# Patient Record
Sex: FEMALE | Race: BLACK OR AFRICAN AMERICAN | NOT HISPANIC OR LATINO | Employment: OTHER | RURAL
[De-identification: names, ages, dates, MRNs, and addresses within clinical notes are randomized per-mention and may not be internally consistent; named-entity substitution may affect disease eponyms.]

---

## 2018-10-18 ENCOUNTER — HISTORICAL (OUTPATIENT)
Dept: ADMINISTRATIVE | Facility: HOSPITAL | Age: 63
End: 2018-10-18

## 2018-10-18 LAB — CRC RECOMMENDATION EXT: NORMAL

## 2018-10-19 LAB
LAB AP CLINICAL INFORMATION: NORMAL
LAB AP DIAGNOSIS - HISTORICAL: NORMAL
LAB AP GROSS PATHOLOGY - HISTORICAL: NORMAL
LAB AP SPECIMEN SUBMITTED - HISTORICAL: NORMAL

## 2018-11-07 ENCOUNTER — HISTORICAL (OUTPATIENT)
Dept: ADMINISTRATIVE | Facility: HOSPITAL | Age: 63
End: 2018-11-07

## 2018-11-08 LAB
LAB AP CLINICAL INFORMATION: NORMAL
LAB AP COMMENTS: NORMAL
LAB AP DIAGNOSIS - HISTORICAL: NORMAL
LAB AP GROSS PATHOLOGY - HISTORICAL: NORMAL
LAB AP SPECIMEN SUBMITTED - HISTORICAL: NORMAL

## 2021-05-05 DIAGNOSIS — M17.12 DEGENERATIVE ARTHRITIS OF LEFT KNEE: Primary | ICD-10-CM

## 2021-05-19 ENCOUNTER — IMMUNIZATION (OUTPATIENT)
Dept: FAMILY MEDICINE | Facility: CLINIC | Age: 66
End: 2021-05-19
Payer: MEDICARE

## 2021-05-19 DIAGNOSIS — Z23 NEED FOR VACCINATION: Primary | ICD-10-CM

## 2021-05-19 PROCEDURE — 0012A COVID-19, MRNA, LNP-S, PF, 100 MCG/0.5 ML DOSE VACCINE: CPT | Mod: ,,, | Performed by: FAMILY MEDICINE

## 2021-05-19 PROCEDURE — 91301 COVID-19, MRNA, LNP-S, PF, 100 MCG/0.5 ML DOSE VACCINE: ICD-10-PCS | Mod: ,,, | Performed by: FAMILY MEDICINE

## 2021-05-19 PROCEDURE — 91301 COVID-19, MRNA, LNP-S, PF, 100 MCG/0.5 ML DOSE VACCINE: CPT | Mod: ,,, | Performed by: FAMILY MEDICINE

## 2021-05-19 PROCEDURE — 0012A COVID-19, MRNA, LNP-S, PF, 100 MCG/0.5 ML DOSE VACCINE: ICD-10-PCS | Mod: ,,, | Performed by: FAMILY MEDICINE

## 2021-05-28 RX ORDER — SERTRALINE HYDROCHLORIDE 50 MG/1
50 TABLET, FILM COATED ORAL DAILY
COMMUNITY
Start: 2021-04-20 | End: 2021-06-03 | Stop reason: SDUPTHER

## 2021-05-28 RX ORDER — ATENOLOL AND CHLORTHALIDONE TABLET 50; 25 MG/1; MG/1
1 TABLET ORAL DAILY
COMMUNITY
Start: 2021-02-02 | End: 2021-05-28 | Stop reason: SDUPTHER

## 2021-06-01 RX ORDER — ATENOLOL AND CHLORTHALIDONE TABLET 50; 25 MG/1; MG/1
1 TABLET ORAL DAILY
Qty: 90 TABLET | Refills: 1 | Status: SHIPPED | OUTPATIENT
Start: 2021-06-01 | End: 2021-06-03 | Stop reason: SDUPTHER

## 2021-06-03 ENCOUNTER — OFFICE VISIT (OUTPATIENT)
Dept: FAMILY MEDICINE | Facility: CLINIC | Age: 66
End: 2021-06-03
Payer: COMMERCIAL

## 2021-06-03 VITALS
RESPIRATION RATE: 20 BRPM | HEIGHT: 66 IN | OXYGEN SATURATION: 99 % | DIASTOLIC BLOOD PRESSURE: 86 MMHG | SYSTOLIC BLOOD PRESSURE: 122 MMHG | BODY MASS INDEX: 46.25 KG/M2 | TEMPERATURE: 96 F | WEIGHT: 287.81 LBS | HEART RATE: 70 BPM

## 2021-06-03 DIAGNOSIS — E03.9 HYPOTHYROIDISM, UNSPECIFIED TYPE: ICD-10-CM

## 2021-06-03 DIAGNOSIS — M25.569 KNEE PAIN, UNSPECIFIED CHRONICITY, UNSPECIFIED LATERALITY: ICD-10-CM

## 2021-06-03 DIAGNOSIS — E66.9 OBESITY, UNSPECIFIED CLASSIFICATION, UNSPECIFIED OBESITY TYPE, UNSPECIFIED WHETHER SERIOUS COMORBIDITY PRESENT: ICD-10-CM

## 2021-06-03 DIAGNOSIS — M54.9 BACK PAIN, UNSPECIFIED BACK LOCATION, UNSPECIFIED BACK PAIN LATERALITY, UNSPECIFIED CHRONICITY: ICD-10-CM

## 2021-06-03 DIAGNOSIS — M17.12 UNILATERAL PRIMARY OSTEOARTHRITIS, LEFT KNEE: ICD-10-CM

## 2021-06-03 DIAGNOSIS — I10 ESSENTIAL HYPERTENSION: ICD-10-CM

## 2021-06-03 DIAGNOSIS — J32.9 SINUSITIS, UNSPECIFIED CHRONICITY, UNSPECIFIED LOCATION: Primary | ICD-10-CM

## 2021-06-03 PROCEDURE — 96372 THER/PROPH/DIAG INJ SC/IM: CPT | Mod: ,,, | Performed by: FAMILY MEDICINE

## 2021-06-03 PROCEDURE — 99214 PR OFFICE/OUTPT VISIT, EST, LEVL IV, 30-39 MIN: ICD-10-PCS | Mod: 25,,, | Performed by: FAMILY MEDICINE

## 2021-06-03 PROCEDURE — 99214 OFFICE O/P EST MOD 30 MIN: CPT | Mod: 25,,, | Performed by: FAMILY MEDICINE

## 2021-06-03 PROCEDURE — 96372 PR INJECTION,THERAP/PROPH/DIAG2ST, IM OR SUBCUT: ICD-10-PCS | Mod: ,,, | Performed by: FAMILY MEDICINE

## 2021-06-03 RX ORDER — ATENOLOL AND CHLORTHALIDONE TABLET 50; 25 MG/1; MG/1
1 TABLET ORAL DAILY
Qty: 90 TABLET | Refills: 1 | Status: SHIPPED | OUTPATIENT
Start: 2021-06-03 | End: 2021-08-05 | Stop reason: SDUPTHER

## 2021-06-03 RX ORDER — BUPROPION HYDROCHLORIDE 150 MG/1
1 TABLET ORAL DAILY
COMMUNITY
Start: 2021-04-28 | End: 2021-06-03 | Stop reason: SDUPTHER

## 2021-06-03 RX ORDER — DICLOFENAC SODIUM 10 MG/G
GEL TOPICAL
COMMUNITY
Start: 2021-05-13 | End: 2021-06-03 | Stop reason: SDUPTHER

## 2021-06-03 RX ORDER — BUPROPION HYDROCHLORIDE 150 MG/1
150 TABLET ORAL DAILY
Qty: 30 TABLET | Refills: 5 | Status: SHIPPED | OUTPATIENT
Start: 2021-06-03 | End: 2021-08-05 | Stop reason: SDUPTHER

## 2021-06-03 RX ORDER — PHENTERMINE HYDROCHLORIDE 37.5 MG/1
37.5 TABLET ORAL
Qty: 30 TABLET | Refills: 1 | Status: SHIPPED | OUTPATIENT
Start: 2021-06-03 | End: 2021-07-03

## 2021-06-03 RX ORDER — MINERAL OIL
180 ENEMA (ML) RECTAL DAILY
COMMUNITY
Start: 2021-03-19 | End: 2021-08-05 | Stop reason: SDUPTHER

## 2021-06-03 RX ORDER — IPRATROPIUM BROMIDE 42 UG/1
SPRAY, METERED NASAL
COMMUNITY
Start: 2021-04-12 | End: 2021-06-03 | Stop reason: SDUPTHER

## 2021-06-03 RX ORDER — SERTRALINE HYDROCHLORIDE 50 MG/1
50 TABLET, FILM COATED ORAL DAILY
Qty: 30 TABLET | Refills: 5 | Status: SHIPPED | OUTPATIENT
Start: 2021-06-03 | End: 2021-08-05 | Stop reason: SDUPTHER

## 2021-06-03 RX ORDER — HYDROCODONE BITARTRATE AND ACETAMINOPHEN 10; 325 MG/1; MG/1
TABLET ORAL
COMMUNITY
Start: 2021-03-11

## 2021-06-03 RX ORDER — IPRATROPIUM BROMIDE 42 UG/1
SPRAY, METERED NASAL
Qty: 15 ML | Refills: 5 | Status: SHIPPED | OUTPATIENT
Start: 2021-06-03 | End: 2021-08-05 | Stop reason: SDUPTHER

## 2021-06-03 RX ORDER — LEVOTHYROXINE SODIUM 125 UG/1
1 TABLET ORAL DAILY
COMMUNITY
Start: 2021-05-20 | End: 2021-06-03 | Stop reason: SDUPTHER

## 2021-06-03 RX ORDER — DICLOFENAC SODIUM 10 MG/G
GEL TOPICAL
Qty: 60 G | Refills: 5 | Status: SHIPPED | OUTPATIENT
Start: 2021-06-03 | End: 2021-08-05 | Stop reason: SDUPTHER

## 2021-06-03 RX ORDER — AZITHROMYCIN 250 MG/1
TABLET, FILM COATED ORAL
Qty: 6 TABLET | Refills: 0 | Status: SHIPPED | OUTPATIENT
Start: 2021-06-03 | End: 2021-10-06 | Stop reason: ALTCHOICE

## 2021-06-03 RX ORDER — CETIRIZINE HYDROCHLORIDE 10 MG/1
10 TABLET ORAL DAILY
COMMUNITY
Start: 2021-03-15 | End: 2021-06-03 | Stop reason: SDUPTHER

## 2021-06-03 RX ORDER — VARENICLINE TARTRATE 1 MG/1
1 TABLET, FILM COATED ORAL 2 TIMES DAILY
COMMUNITY
Start: 2021-05-13 | End: 2021-06-03

## 2021-06-03 RX ORDER — MECLIZINE HYDROCHLORIDE 25 MG/1
1 TABLET ORAL 3 TIMES DAILY
COMMUNITY
Start: 2021-06-02 | End: 2021-08-05 | Stop reason: SDUPTHER

## 2021-06-03 RX ORDER — TIZANIDINE 4 MG/1
4 TABLET ORAL 3 TIMES DAILY
Qty: 90 TABLET | Refills: 1 | Status: SHIPPED | OUTPATIENT
Start: 2021-06-03 | End: 2021-08-05 | Stop reason: SDUPTHER

## 2021-06-03 RX ORDER — DULAGLUTIDE 1.5 MG/.5ML
INJECTION, SOLUTION SUBCUTANEOUS
COMMUNITY
Start: 2021-04-20 | End: 2021-06-03 | Stop reason: SDUPTHER

## 2021-06-03 RX ORDER — GABAPENTIN 100 MG/1
100 CAPSULE ORAL 3 TIMES DAILY
Qty: 90 CAPSULE | Refills: 1 | Status: SHIPPED | OUTPATIENT
Start: 2021-06-03 | End: 2021-08-05 | Stop reason: SDUPTHER

## 2021-06-03 RX ORDER — IBUPROFEN 800 MG/1
800 TABLET ORAL 3 TIMES DAILY
Qty: 90 TABLET | Refills: 0 | Status: SHIPPED | OUTPATIENT
Start: 2021-06-03 | End: 2021-08-05 | Stop reason: SDUPTHER

## 2021-06-03 RX ORDER — GABAPENTIN 100 MG/1
100 CAPSULE ORAL 3 TIMES DAILY
COMMUNITY
End: 2021-06-03 | Stop reason: SDUPTHER

## 2021-06-03 RX ORDER — DULAGLUTIDE 1.5 MG/.5ML
INJECTION, SOLUTION SUBCUTANEOUS
Qty: 1 PEN | Refills: 5 | Status: SHIPPED | OUTPATIENT
Start: 2021-06-03 | End: 2021-08-05 | Stop reason: SDUPTHER

## 2021-06-03 RX ORDER — POTASSIUM CHLORIDE 750 MG/1
10 TABLET, EXTENDED RELEASE ORAL DAILY
Qty: 30 TABLET | Refills: 5 | Status: SHIPPED | OUTPATIENT
Start: 2021-06-03 | End: 2021-08-05 | Stop reason: SDUPTHER

## 2021-06-03 RX ORDER — METHYLPREDNISOLONE ACETATE 40 MG/ML
40 INJECTION, SUSPENSION INTRA-ARTICULAR; INTRALESIONAL; INTRAMUSCULAR; SOFT TISSUE
Status: COMPLETED | OUTPATIENT
Start: 2021-06-03 | End: 2021-06-03

## 2021-06-03 RX ORDER — TIZANIDINE 4 MG/1
1 TABLET ORAL 3 TIMES DAILY
COMMUNITY
Start: 2021-03-04 | End: 2021-06-03 | Stop reason: SDUPTHER

## 2021-06-03 RX ORDER — POTASSIUM CHLORIDE 750 MG/1
1 TABLET, EXTENDED RELEASE ORAL DAILY
COMMUNITY
Start: 2021-05-01 | End: 2021-06-03 | Stop reason: SDUPTHER

## 2021-06-03 RX ORDER — CETIRIZINE HYDROCHLORIDE 10 MG/1
10 TABLET ORAL DAILY
Qty: 30 TABLET | Refills: 1 | Status: SHIPPED | OUTPATIENT
Start: 2021-06-03 | End: 2021-08-05 | Stop reason: SDUPTHER

## 2021-06-03 RX ORDER — IBUPROFEN 800 MG/1
800 TABLET ORAL 3 TIMES DAILY
COMMUNITY
Start: 2021-03-03 | End: 2021-06-03 | Stop reason: SDUPTHER

## 2021-06-03 RX ORDER — LEVOTHYROXINE SODIUM 125 UG/1
125 TABLET ORAL DAILY
Qty: 90 TABLET | Refills: 1 | Status: SHIPPED | OUTPATIENT
Start: 2021-06-03 | End: 2021-08-05 | Stop reason: SDUPTHER

## 2021-06-03 RX ADMIN — METHYLPREDNISOLONE ACETATE 40 MG: 40 INJECTION, SUSPENSION INTRA-ARTICULAR; INTRALESIONAL; INTRAMUSCULAR; SOFT TISSUE at 11:06

## 2021-06-25 DIAGNOSIS — Z96.652 S/P TOTAL KNEE ARTHROPLASTY, LEFT: Primary | ICD-10-CM

## 2021-07-08 ENCOUNTER — CLINICAL SUPPORT (OUTPATIENT)
Dept: REHABILITATION | Facility: HOSPITAL | Age: 66
End: 2021-07-08
Payer: COMMERCIAL

## 2021-07-08 DIAGNOSIS — Z96.652 PRESENCE OF LEFT ARTIFICIAL KNEE JOINT: Primary | ICD-10-CM

## 2021-07-08 PROCEDURE — 97110 THERAPEUTIC EXERCISES: CPT | Mod: PN

## 2021-07-08 PROCEDURE — 97162 PT EVAL MOD COMPLEX 30 MIN: CPT | Mod: PN

## 2021-07-16 ENCOUNTER — CLINICAL SUPPORT (OUTPATIENT)
Dept: REHABILITATION | Facility: HOSPITAL | Age: 66
End: 2021-07-16
Payer: COMMERCIAL

## 2021-07-16 DIAGNOSIS — Z74.09 DECREASED FUNCTIONAL MOBILITY AND ENDURANCE: Primary | ICD-10-CM

## 2021-07-16 PROCEDURE — 97110 THERAPEUTIC EXERCISES: CPT | Mod: PN,CQ

## 2021-07-21 ENCOUNTER — CLINICAL SUPPORT (OUTPATIENT)
Dept: REHABILITATION | Facility: HOSPITAL | Age: 66
End: 2021-07-21
Payer: COMMERCIAL

## 2021-07-21 DIAGNOSIS — M17.12 UNILATERAL PRIMARY OSTEOARTHRITIS, LEFT KNEE: ICD-10-CM

## 2021-07-21 DIAGNOSIS — Z96.652 PRESENCE OF LEFT ARTIFICIAL KNEE JOINT: ICD-10-CM

## 2021-07-21 PROCEDURE — 97110 THERAPEUTIC EXERCISES: CPT | Mod: PN

## 2021-07-23 ENCOUNTER — CLINICAL SUPPORT (OUTPATIENT)
Dept: REHABILITATION | Facility: HOSPITAL | Age: 66
End: 2021-07-23
Payer: COMMERCIAL

## 2021-07-23 DIAGNOSIS — Z74.09 DECREASED FUNCTIONAL MOBILITY AND ENDURANCE: Primary | ICD-10-CM

## 2021-07-23 PROCEDURE — 97110 THERAPEUTIC EXERCISES: CPT | Mod: PN,CQ

## 2021-07-26 ENCOUNTER — CLINICAL SUPPORT (OUTPATIENT)
Dept: REHABILITATION | Facility: HOSPITAL | Age: 66
End: 2021-07-26
Payer: COMMERCIAL

## 2021-07-26 DIAGNOSIS — Z74.09 DECREASED FUNCTIONAL MOBILITY AND ENDURANCE: Primary | ICD-10-CM

## 2021-07-26 PROCEDURE — 97110 THERAPEUTIC EXERCISES: CPT | Mod: PN,CQ

## 2021-07-28 ENCOUNTER — CLINICAL SUPPORT (OUTPATIENT)
Dept: REHABILITATION | Facility: HOSPITAL | Age: 66
End: 2021-07-28
Payer: COMMERCIAL

## 2021-07-28 PROCEDURE — 97110 THERAPEUTIC EXERCISES: CPT | Mod: PN,CQ

## 2021-08-04 ENCOUNTER — CLINICAL SUPPORT (OUTPATIENT)
Dept: REHABILITATION | Facility: HOSPITAL | Age: 66
End: 2021-08-04
Payer: COMMERCIAL

## 2021-08-04 DIAGNOSIS — Z96.652 S/P TOTAL KNEE ARTHROPLASTY, LEFT: Primary | ICD-10-CM

## 2021-08-04 PROCEDURE — 97110 THERAPEUTIC EXERCISES: CPT | Mod: PN,CQ

## 2021-08-05 ENCOUNTER — OFFICE VISIT (OUTPATIENT)
Dept: FAMILY MEDICINE | Facility: CLINIC | Age: 66
End: 2021-08-05
Payer: COMMERCIAL

## 2021-08-05 VITALS
HEART RATE: 68 BPM | RESPIRATION RATE: 22 BRPM | TEMPERATURE: 97 F | DIASTOLIC BLOOD PRESSURE: 80 MMHG | WEIGHT: 288 LBS | SYSTOLIC BLOOD PRESSURE: 134 MMHG | OXYGEN SATURATION: 99 % | HEIGHT: 66 IN | BODY MASS INDEX: 46.28 KG/M2

## 2021-08-05 DIAGNOSIS — I10 ESSENTIAL HYPERTENSION: ICD-10-CM

## 2021-08-05 DIAGNOSIS — M25.569 KNEE PAIN, UNSPECIFIED CHRONICITY, UNSPECIFIED LATERALITY: ICD-10-CM

## 2021-08-05 DIAGNOSIS — E66.9 OBESITY, UNSPECIFIED CLASSIFICATION, UNSPECIFIED OBESITY TYPE, UNSPECIFIED WHETHER SERIOUS COMORBIDITY PRESENT: ICD-10-CM

## 2021-08-05 DIAGNOSIS — E03.9 HYPOTHYROIDISM, UNSPECIFIED TYPE: ICD-10-CM

## 2021-08-05 DIAGNOSIS — M25.579 ANKLE PAIN, UNSPECIFIED CHRONICITY, UNSPECIFIED LATERALITY: ICD-10-CM

## 2021-08-05 DIAGNOSIS — J30.9 ALLERGIC RHINITIS, UNSPECIFIED SEASONALITY, UNSPECIFIED TRIGGER: ICD-10-CM

## 2021-08-05 DIAGNOSIS — R42 DIZZINESS: Primary | ICD-10-CM

## 2021-08-05 DIAGNOSIS — M54.9 BACK PAIN, UNSPECIFIED BACK LOCATION, UNSPECIFIED BACK PAIN LATERALITY, UNSPECIFIED CHRONICITY: ICD-10-CM

## 2021-08-05 DIAGNOSIS — J32.9 SINUSITIS, UNSPECIFIED CHRONICITY, UNSPECIFIED LOCATION: ICD-10-CM

## 2021-08-05 PROCEDURE — 99214 PR OFFICE/OUTPT VISIT, EST, LEVL IV, 30-39 MIN: ICD-10-PCS | Mod: ,,, | Performed by: FAMILY MEDICINE

## 2021-08-05 PROCEDURE — 99214 OFFICE O/P EST MOD 30 MIN: CPT | Mod: ,,, | Performed by: FAMILY MEDICINE

## 2021-08-05 RX ORDER — POTASSIUM CHLORIDE 750 MG/1
10 TABLET, EXTENDED RELEASE ORAL DAILY
Qty: 90 TABLET | Refills: 1 | Status: SHIPPED | OUTPATIENT
Start: 2021-08-05 | End: 2022-02-07 | Stop reason: SDUPTHER

## 2021-08-05 RX ORDER — IBUPROFEN 800 MG/1
800 TABLET ORAL 3 TIMES DAILY
Qty: 90 TABLET | Refills: 5 | Status: SHIPPED | OUTPATIENT
Start: 2021-08-05 | End: 2023-06-12 | Stop reason: SDUPTHER

## 2021-08-05 RX ORDER — TIZANIDINE 4 MG/1
4 TABLET ORAL 3 TIMES DAILY
Qty: 90 TABLET | Refills: 5 | Status: SHIPPED | OUTPATIENT
Start: 2021-08-05 | End: 2021-11-29 | Stop reason: SDUPTHER

## 2021-08-05 RX ORDER — BUPROPION HYDROCHLORIDE 150 MG/1
150 TABLET ORAL DAILY
Qty: 90 TABLET | Refills: 1 | Status: SHIPPED | OUTPATIENT
Start: 2021-08-05 | End: 2022-02-07 | Stop reason: SDUPTHER

## 2021-08-05 RX ORDER — IPRATROPIUM BROMIDE 42 UG/1
2 SPRAY, METERED NASAL 4 TIMES DAILY
Qty: 15 ML | Refills: 5 | Status: SHIPPED | OUTPATIENT
Start: 2021-08-05 | End: 2022-02-07 | Stop reason: SDUPTHER

## 2021-08-05 RX ORDER — ATENOLOL AND CHLORTHALIDONE TABLET 50; 25 MG/1; MG/1
1 TABLET ORAL DAILY
Qty: 90 TABLET | Refills: 1 | Status: SHIPPED | OUTPATIENT
Start: 2021-08-05 | End: 2021-11-29 | Stop reason: SDUPTHER

## 2021-08-05 RX ORDER — MINERAL OIL
180 ENEMA (ML) RECTAL DAILY
Qty: 90 TABLET | Refills: 3 | Status: SHIPPED | OUTPATIENT
Start: 2021-08-05 | End: 2021-10-06 | Stop reason: ALTCHOICE

## 2021-08-05 RX ORDER — LEVOTHYROXINE SODIUM 125 UG/1
125 TABLET ORAL DAILY
Qty: 90 TABLET | Refills: 1 | Status: SHIPPED | OUTPATIENT
Start: 2021-08-05 | End: 2022-02-07 | Stop reason: SDUPTHER

## 2021-08-05 RX ORDER — MECLIZINE HYDROCHLORIDE 25 MG/1
25 TABLET ORAL 3 TIMES DAILY
Qty: 270 TABLET | Refills: 1 | Status: SHIPPED | OUTPATIENT
Start: 2021-08-05 | End: 2022-02-01

## 2021-08-05 RX ORDER — SERTRALINE HYDROCHLORIDE 50 MG/1
50 TABLET, FILM COATED ORAL DAILY
Qty: 30 TABLET | Refills: 5 | Status: SHIPPED | OUTPATIENT
Start: 2021-08-05 | End: 2022-02-07 | Stop reason: SDUPTHER

## 2021-08-05 RX ORDER — DULAGLUTIDE 1.5 MG/.5ML
INJECTION, SOLUTION SUBCUTANEOUS
Qty: 4 PEN | Refills: 5 | Status: SHIPPED | OUTPATIENT
Start: 2021-08-05 | End: 2021-11-29 | Stop reason: ALTCHOICE

## 2021-08-05 RX ORDER — AZITHROMYCIN 250 MG/1
TABLET, FILM COATED ORAL
Qty: 6 TABLET | Refills: 0 | Status: SHIPPED | OUTPATIENT
Start: 2021-08-05 | End: 2021-10-06 | Stop reason: ALTCHOICE

## 2021-08-05 RX ORDER — GABAPENTIN 100 MG/1
100 CAPSULE ORAL 3 TIMES DAILY
Qty: 90 CAPSULE | Refills: 5 | Status: SHIPPED | OUTPATIENT
Start: 2021-08-05 | End: 2022-02-07 | Stop reason: SDUPTHER

## 2021-08-05 RX ORDER — CETIRIZINE HYDROCHLORIDE 10 MG/1
10 TABLET ORAL DAILY
Qty: 90 TABLET | Refills: 3 | Status: SHIPPED | OUTPATIENT
Start: 2021-08-05 | End: 2021-10-06 | Stop reason: SDUPTHER

## 2021-08-05 RX ORDER — DICLOFENAC SODIUM 10 MG/G
GEL TOPICAL
Qty: 100 G | Refills: 5 | Status: SHIPPED | OUTPATIENT
Start: 2021-08-05 | End: 2022-06-22 | Stop reason: SDUPTHER

## 2021-08-05 RX ORDER — IPRATROPIUM BROMIDE 42 UG/1
SPRAY, METERED NASAL
Qty: 45 ML | Refills: 1 | Status: SHIPPED | OUTPATIENT
Start: 2021-08-05 | End: 2021-10-06 | Stop reason: DRUGHIGH

## 2021-08-11 ENCOUNTER — CLINICAL SUPPORT (OUTPATIENT)
Dept: REHABILITATION | Facility: HOSPITAL | Age: 66
End: 2021-08-11
Payer: COMMERCIAL

## 2021-08-11 DIAGNOSIS — Z96.652 S/P TOTAL KNEE ARTHROPLASTY, LEFT: Primary | ICD-10-CM

## 2021-08-11 PROCEDURE — 97110 THERAPEUTIC EXERCISES: CPT | Mod: PN,CQ

## 2021-08-15 PROBLEM — M25.579 ANKLE PAIN: Status: ACTIVE | Noted: 2021-08-15

## 2021-08-16 ENCOUNTER — CLINICAL SUPPORT (OUTPATIENT)
Dept: REHABILITATION | Facility: HOSPITAL | Age: 66
End: 2021-08-16
Payer: COMMERCIAL

## 2021-08-16 DIAGNOSIS — Z96.652 S/P TOTAL KNEE ARTHROPLASTY, LEFT: Primary | ICD-10-CM

## 2021-08-16 PROCEDURE — 97110 THERAPEUTIC EXERCISES: CPT | Mod: PN,CQ

## 2021-08-18 ENCOUNTER — CLINICAL SUPPORT (OUTPATIENT)
Dept: REHABILITATION | Facility: HOSPITAL | Age: 66
End: 2021-08-18
Payer: COMMERCIAL

## 2021-08-18 DIAGNOSIS — M17.12 UNILATERAL PRIMARY OSTEOARTHRITIS, LEFT KNEE: Primary | ICD-10-CM

## 2021-08-18 PROCEDURE — 97110 THERAPEUTIC EXERCISES: CPT | Mod: PN

## 2021-08-23 ENCOUNTER — CLINICAL SUPPORT (OUTPATIENT)
Dept: REHABILITATION | Facility: HOSPITAL | Age: 66
End: 2021-08-23
Payer: COMMERCIAL

## 2021-08-23 DIAGNOSIS — Z74.09 DECREASED FUNCTIONAL MOBILITY AND ENDURANCE: Primary | ICD-10-CM

## 2021-08-23 PROCEDURE — 97110 THERAPEUTIC EXERCISES: CPT | Mod: PN,CQ

## 2021-08-25 ENCOUNTER — CLINICAL SUPPORT (OUTPATIENT)
Dept: REHABILITATION | Facility: HOSPITAL | Age: 66
End: 2021-08-25
Payer: COMMERCIAL

## 2021-08-25 DIAGNOSIS — Z96.652 S/P TOTAL KNEE ARTHROPLASTY, LEFT: Primary | ICD-10-CM

## 2021-08-25 PROCEDURE — 97110 THERAPEUTIC EXERCISES: CPT | Mod: PN,CQ

## 2021-09-01 ENCOUNTER — CLINICAL SUPPORT (OUTPATIENT)
Dept: REHABILITATION | Facility: HOSPITAL | Age: 66
End: 2021-09-01
Payer: COMMERCIAL

## 2021-09-01 DIAGNOSIS — M17.12 UNILATERAL PRIMARY OSTEOARTHRITIS, LEFT KNEE: ICD-10-CM

## 2021-09-01 PROCEDURE — 97110 THERAPEUTIC EXERCISES: CPT | Mod: PN

## 2021-09-07 ENCOUNTER — CLINICAL SUPPORT (OUTPATIENT)
Dept: REHABILITATION | Facility: HOSPITAL | Age: 66
End: 2021-09-07
Payer: COMMERCIAL

## 2021-09-07 DIAGNOSIS — Z96.652 S/P TOTAL KNEE ARTHROPLASTY, LEFT: Primary | ICD-10-CM

## 2021-09-07 PROCEDURE — 97110 THERAPEUTIC EXERCISES: CPT | Mod: PN,CQ

## 2021-09-09 ENCOUNTER — CLINICAL SUPPORT (OUTPATIENT)
Dept: REHABILITATION | Facility: HOSPITAL | Age: 66
End: 2021-09-09
Payer: COMMERCIAL

## 2021-09-09 DIAGNOSIS — M17.12 UNILATERAL PRIMARY OSTEOARTHRITIS, LEFT KNEE: ICD-10-CM

## 2021-09-09 PROCEDURE — 97110 THERAPEUTIC EXERCISES: CPT | Mod: PN

## 2021-09-13 ENCOUNTER — CLINICAL SUPPORT (OUTPATIENT)
Dept: REHABILITATION | Facility: HOSPITAL | Age: 66
End: 2021-09-13
Payer: COMMERCIAL

## 2021-09-13 DIAGNOSIS — Z96.652 S/P TOTAL KNEE ARTHROPLASTY, LEFT: Primary | ICD-10-CM

## 2021-09-13 PROCEDURE — 97110 THERAPEUTIC EXERCISES: CPT | Mod: KX,PN,CQ

## 2021-09-20 ENCOUNTER — CLINICAL SUPPORT (OUTPATIENT)
Dept: REHABILITATION | Facility: HOSPITAL | Age: 66
End: 2021-09-20
Payer: COMMERCIAL

## 2021-09-20 DIAGNOSIS — Z96.652 S/P TOTAL KNEE ARTHROPLASTY, LEFT: Primary | ICD-10-CM

## 2021-09-20 PROCEDURE — 97110 THERAPEUTIC EXERCISES: CPT | Mod: PN,CQ

## 2021-09-22 ENCOUNTER — CLINICAL SUPPORT (OUTPATIENT)
Dept: REHABILITATION | Facility: HOSPITAL | Age: 66
End: 2021-09-22
Payer: COMMERCIAL

## 2021-09-22 DIAGNOSIS — Z96.652 PRESENCE OF LEFT ARTIFICIAL KNEE JOINT: ICD-10-CM

## 2021-09-22 DIAGNOSIS — M17.12 UNILATERAL PRIMARY OSTEOARTHRITIS, LEFT KNEE: Primary | ICD-10-CM

## 2021-09-22 PROCEDURE — 97110 THERAPEUTIC EXERCISES: CPT | Mod: PN

## 2021-09-27 ENCOUNTER — CLINICAL SUPPORT (OUTPATIENT)
Dept: REHABILITATION | Facility: HOSPITAL | Age: 66
End: 2021-09-27
Payer: COMMERCIAL

## 2021-09-27 DIAGNOSIS — Z96.652 S/P TOTAL KNEE ARTHROPLASTY, LEFT: Primary | ICD-10-CM

## 2021-09-27 PROCEDURE — 97110 THERAPEUTIC EXERCISES: CPT | Mod: PN,CQ

## 2021-10-06 ENCOUNTER — OFFICE VISIT (OUTPATIENT)
Dept: FAMILY MEDICINE | Facility: CLINIC | Age: 66
End: 2021-10-06
Payer: COMMERCIAL

## 2021-10-06 VITALS
SYSTOLIC BLOOD PRESSURE: 108 MMHG | WEIGHT: 287.19 LBS | TEMPERATURE: 96 F | BODY MASS INDEX: 46.16 KG/M2 | DIASTOLIC BLOOD PRESSURE: 72 MMHG | HEIGHT: 66 IN | OXYGEN SATURATION: 98 % | RESPIRATION RATE: 20 BRPM | HEART RATE: 58 BPM

## 2021-10-06 DIAGNOSIS — L91.0 KELOID CICATRIX: Primary | ICD-10-CM

## 2021-10-06 DIAGNOSIS — E66.9 OBESITY, UNSPECIFIED CLASSIFICATION, UNSPECIFIED OBESITY TYPE, UNSPECIFIED WHETHER SERIOUS COMORBIDITY PRESENT: ICD-10-CM

## 2021-10-06 DIAGNOSIS — Z23 ENCOUNTER FOR IMMUNIZATION: ICD-10-CM

## 2021-10-06 DIAGNOSIS — J32.9 SINUSITIS, UNSPECIFIED CHRONICITY, UNSPECIFIED LOCATION: ICD-10-CM

## 2021-10-06 DIAGNOSIS — E03.9 ACQUIRED HYPOTHYROIDISM: ICD-10-CM

## 2021-10-06 DIAGNOSIS — I10 ESSENTIAL HYPERTENSION: ICD-10-CM

## 2021-10-06 LAB
ALBUMIN SERPL BCP-MCNC: 3.4 G/DL (ref 3.5–5)
ALBUMIN/GLOB SERPL: 0.8 {RATIO}
ALP SERPL-CCNC: 83 U/L (ref 50–130)
ALT SERPL W P-5'-P-CCNC: 17 U/L (ref 13–56)
ANION GAP SERPL CALCULATED.3IONS-SCNC: 9 MMOL/L (ref 7–16)
AST SERPL W P-5'-P-CCNC: 16 U/L (ref 15–37)
BASOPHILS # BLD AUTO: 0.09 K/UL (ref 0–0.2)
BASOPHILS NFR BLD AUTO: 1.4 % (ref 0–1)
BILIRUB SERPL-MCNC: 0.4 MG/DL (ref 0–1.2)
BUN SERPL-MCNC: 13 MG/DL (ref 7–18)
BUN/CREAT SERPL: 13 (ref 6–20)
CALCIUM SERPL-MCNC: 9.7 MG/DL (ref 8.5–10.1)
CHLORIDE SERPL-SCNC: 102 MMOL/L (ref 98–107)
CHOLEST SERPL-MCNC: 181 MG/DL (ref 0–200)
CHOLEST/HDLC SERPL: 3.5 {RATIO}
CO2 SERPL-SCNC: 31 MMOL/L (ref 21–32)
CREAT SERPL-MCNC: 0.98 MG/DL (ref 0.55–1.02)
DIFFERENTIAL METHOD BLD: ABNORMAL
EOSINOPHIL # BLD AUTO: 0.13 K/UL (ref 0–0.5)
EOSINOPHIL NFR BLD AUTO: 2.1 % (ref 1–4)
ERYTHROCYTE [DISTWIDTH] IN BLOOD BY AUTOMATED COUNT: 13.3 % (ref 11.5–14.5)
GLOBULIN SER-MCNC: 4.4 G/DL (ref 2–4)
GLUCOSE SERPL-MCNC: 96 MG/DL (ref 74–106)
HCT VFR BLD AUTO: 40.5 % (ref 38–47)
HDLC SERPL-MCNC: 51 MG/DL (ref 40–60)
HGB BLD-MCNC: 13.6 G/DL (ref 12–16)
IMM GRANULOCYTES # BLD AUTO: 0.01 K/UL (ref 0–0.04)
IMM GRANULOCYTES NFR BLD: 0.2 % (ref 0–0.4)
LDLC SERPL CALC-MCNC: 93 MG/DL
LDLC/HDLC SERPL: 1.8 {RATIO}
LYMPHOCYTES # BLD AUTO: 2.65 K/UL (ref 1–4.8)
LYMPHOCYTES NFR BLD AUTO: 42.1 % (ref 27–41)
LYMPHOCYTES NFR BLD MANUAL: 46 % (ref 27–41)
MCH RBC QN AUTO: 29.4 PG (ref 27–31)
MCHC RBC AUTO-ENTMCNC: 33.6 G/DL (ref 32–36)
MCV RBC AUTO: 87.5 FL (ref 80–96)
MONOCYTES # BLD AUTO: 0.4 K/UL (ref 0–0.8)
MONOCYTES NFR BLD AUTO: 6.3 % (ref 2–6)
MONOCYTES NFR BLD MANUAL: 9 % (ref 2–6)
MPC BLD CALC-MCNC: 10.4 FL (ref 9.4–12.4)
NEUTROPHILS # BLD AUTO: 3.02 K/UL (ref 1.8–7.7)
NEUTROPHILS NFR BLD AUTO: 47.9 % (ref 53–65)
NEUTS SEG NFR BLD MANUAL: 45 % (ref 50–62)
NONHDLC SERPL-MCNC: 130 MG/DL
NRBC # BLD AUTO: 0 X10E3/UL
NRBC, AUTO (.00): 0 %
PLATELET # BLD AUTO: 396 K/UL (ref 150–400)
PLATELET MORPHOLOGY: NORMAL
POTASSIUM SERPL-SCNC: 3 MMOL/L (ref 3.5–5.1)
PROT SERPL-MCNC: 7.8 G/DL (ref 6.4–8.2)
RBC # BLD AUTO: 4.63 M/UL (ref 4.2–5.4)
RBC MORPH BLD: NORMAL
SODIUM SERPL-SCNC: 139 MMOL/L (ref 136–145)
T4 SERPL-MCNC: 10.2 ΜG/DL (ref 4.8–13.9)
TRIGL SERPL-MCNC: 185 MG/DL (ref 35–150)
TSH SERPL DL<=0.005 MIU/L-ACNC: 0.38 UIU/ML (ref 0.36–3.74)
VLDLC SERPL-MCNC: 37 MG/DL
WBC # BLD AUTO: 6.3 K/UL (ref 4.5–11)

## 2021-10-06 PROCEDURE — 80053 COMPREHENSIVE METABOLIC PANEL: ICD-10-PCS | Mod: QW,,, | Performed by: CLINICAL MEDICAL LABORATORY

## 2021-10-06 PROCEDURE — 84443 ASSAY THYROID STIM HORMONE: CPT | Mod: QW,,, | Performed by: CLINICAL MEDICAL LABORATORY

## 2021-10-06 PROCEDURE — 80061 LIPID PANEL: ICD-10-PCS | Mod: QW,,, | Performed by: CLINICAL MEDICAL LABORATORY

## 2021-10-06 PROCEDURE — 99214 PR OFFICE/OUTPT VISIT, EST, LEVL IV, 30-39 MIN: ICD-10-PCS | Mod: 25,,, | Performed by: FAMILY MEDICINE

## 2021-10-06 PROCEDURE — 85025 CBC WITH DIFFERENTIAL: ICD-10-PCS | Mod: QW,,, | Performed by: CLINICAL MEDICAL LABORATORY

## 2021-10-06 PROCEDURE — 84436 T4: ICD-10-PCS | Mod: ,,, | Performed by: CLINICAL MEDICAL LABORATORY

## 2021-10-06 PROCEDURE — 85025 COMPLETE CBC W/AUTO DIFF WBC: CPT | Mod: QW,,, | Performed by: CLINICAL MEDICAL LABORATORY

## 2021-10-06 PROCEDURE — 84443 TSH: ICD-10-PCS | Mod: QW,,, | Performed by: CLINICAL MEDICAL LABORATORY

## 2021-10-06 PROCEDURE — G0008 ADMIN INFLUENZA VIRUS VAC: HCPCS | Mod: ,,, | Performed by: FAMILY MEDICINE

## 2021-10-06 PROCEDURE — G0008 FLU VACCINE - QUADRIVALENT - HIGH DOSE (65+) PRESERVATIVE FREE IM: ICD-10-PCS | Mod: ,,, | Performed by: FAMILY MEDICINE

## 2021-10-06 PROCEDURE — 80053 COMPREHEN METABOLIC PANEL: CPT | Mod: QW,,, | Performed by: CLINICAL MEDICAL LABORATORY

## 2021-10-06 PROCEDURE — 84436 ASSAY OF TOTAL THYROXINE: CPT | Mod: ,,, | Performed by: CLINICAL MEDICAL LABORATORY

## 2021-10-06 PROCEDURE — 90662 IIV NO PRSV INCREASED AG IM: CPT | Mod: ,,, | Performed by: FAMILY MEDICINE

## 2021-10-06 PROCEDURE — 80061 LIPID PANEL: CPT | Mod: QW,,, | Performed by: CLINICAL MEDICAL LABORATORY

## 2021-10-06 PROCEDURE — 99214 OFFICE O/P EST MOD 30 MIN: CPT | Mod: 25,,, | Performed by: FAMILY MEDICINE

## 2021-10-06 PROCEDURE — 90662 FLU VACCINE - QUADRIVALENT - HIGH DOSE (65+) PRESERVATIVE FREE IM: ICD-10-PCS | Mod: ,,, | Performed by: FAMILY MEDICINE

## 2021-10-06 RX ORDER — PHENTERMINE HYDROCHLORIDE 37.5 MG/1
37.5 TABLET ORAL
COMMUNITY
Start: 2021-08-20 | End: 2021-10-06 | Stop reason: SDUPTHER

## 2021-10-06 RX ORDER — DULAGLUTIDE 1.5 MG/.5ML
INJECTION, SOLUTION SUBCUTANEOUS
Qty: 4 PEN | Refills: 5 | Status: CANCELLED | OUTPATIENT
Start: 2021-10-06

## 2021-10-06 RX ORDER — PHENTERMINE HYDROCHLORIDE 37.5 MG/1
37.5 TABLET ORAL
Qty: 30 TABLET | Refills: 0 | Status: SHIPPED | OUTPATIENT
Start: 2021-10-06 | End: 2021-11-29 | Stop reason: SDUPTHER

## 2021-10-06 RX ORDER — CETIRIZINE HYDROCHLORIDE 10 MG/1
10 TABLET ORAL DAILY
Qty: 90 TABLET | Refills: 3 | Status: SHIPPED | OUTPATIENT
Start: 2021-10-06 | End: 2021-11-29 | Stop reason: SDUPTHER

## 2021-10-06 RX ORDER — CETIRIZINE HYDROCHLORIDE 10 MG/1
10 TABLET ORAL DAILY
Qty: 30 TABLET | Refills: 5 | Status: CANCELLED | OUTPATIENT
Start: 2021-10-06

## 2021-11-05 ENCOUNTER — TELEPHONE (OUTPATIENT)
Dept: FAMILY MEDICINE | Facility: CLINIC | Age: 66
End: 2021-11-05
Payer: COMMERCIAL

## 2021-11-18 ENCOUNTER — TELEPHONE (OUTPATIENT)
Dept: FAMILY MEDICINE | Facility: CLINIC | Age: 66
End: 2021-11-18
Payer: COMMERCIAL

## 2021-11-18 DIAGNOSIS — I10 HYPERTENSION, UNSPECIFIED TYPE: ICD-10-CM

## 2021-11-18 DIAGNOSIS — Z12.11 ENCOUNTER FOR SCREENING FOR MALIGNANT NEOPLASM OF COLON: Primary | ICD-10-CM

## 2021-11-29 ENCOUNTER — OFFICE VISIT (OUTPATIENT)
Dept: FAMILY MEDICINE | Facility: CLINIC | Age: 66
End: 2021-11-29
Payer: COMMERCIAL

## 2021-11-29 VITALS
WEIGHT: 287 LBS | RESPIRATION RATE: 22 BRPM | BODY MASS INDEX: 46.12 KG/M2 | OXYGEN SATURATION: 96 % | SYSTOLIC BLOOD PRESSURE: 132 MMHG | TEMPERATURE: 97 F | DIASTOLIC BLOOD PRESSURE: 74 MMHG | HEIGHT: 66 IN | HEART RATE: 82 BPM

## 2021-11-29 DIAGNOSIS — J32.9 SINUSITIS, UNSPECIFIED CHRONICITY, UNSPECIFIED LOCATION: ICD-10-CM

## 2021-11-29 DIAGNOSIS — R09.81 NASAL CONGESTION: Primary | ICD-10-CM

## 2021-11-29 DIAGNOSIS — E66.9 OBESITY, UNSPECIFIED CLASSIFICATION, UNSPECIFIED OBESITY TYPE, UNSPECIFIED WHETHER SERIOUS COMORBIDITY PRESENT: ICD-10-CM

## 2021-11-29 DIAGNOSIS — M54.9 BACK PAIN, UNSPECIFIED BACK LOCATION, UNSPECIFIED BACK PAIN LATERALITY, UNSPECIFIED CHRONICITY: ICD-10-CM

## 2021-11-29 DIAGNOSIS — I10 ESSENTIAL HYPERTENSION: ICD-10-CM

## 2021-11-29 LAB
CTP QC/QA: YES
FLUAV AG NPH QL: NEGATIVE
FLUBV AG NPH QL: NEGATIVE
SARS-COV-2 AG RESP QL IA.RAPID: NEGATIVE

## 2021-11-29 PROCEDURE — 99214 PR OFFICE/OUTPT VISIT, EST, LEVL IV, 30-39 MIN: ICD-10-PCS | Mod: 25,,, | Performed by: FAMILY MEDICINE

## 2021-11-29 PROCEDURE — 99214 OFFICE O/P EST MOD 30 MIN: CPT | Mod: 25,,, | Performed by: FAMILY MEDICINE

## 2021-11-29 PROCEDURE — 87428 POCT SARS-COV2 (COVID) WITH FLU ANTIGEN: ICD-10-PCS | Mod: QW,,, | Performed by: FAMILY MEDICINE

## 2021-11-29 PROCEDURE — 87428 SARSCOV & INF VIR A&B AG IA: CPT | Mod: QW,,, | Performed by: FAMILY MEDICINE

## 2021-11-29 PROCEDURE — 96372 THER/PROPH/DIAG INJ SC/IM: CPT | Mod: ,,, | Performed by: FAMILY MEDICINE

## 2021-11-29 PROCEDURE — 96372 PR INJECTION,THERAP/PROPH/DIAG2ST, IM OR SUBCUT: ICD-10-PCS | Mod: ,,, | Performed by: FAMILY MEDICINE

## 2021-11-29 RX ORDER — CEFTRIAXONE 1 G/1
1 INJECTION, POWDER, FOR SOLUTION INTRAMUSCULAR; INTRAVENOUS
Status: COMPLETED | OUTPATIENT
Start: 2021-11-29 | End: 2021-11-29

## 2021-11-29 RX ORDER — FUROSEMIDE 20 MG/1
20 TABLET ORAL DAILY
Qty: 30 TABLET | Refills: 5 | Status: SHIPPED | OUTPATIENT
Start: 2021-11-29 | End: 2023-03-31 | Stop reason: SDUPTHER

## 2021-11-29 RX ORDER — ATENOLOL AND CHLORTHALIDONE TABLET 50; 25 MG/1; MG/1
1 TABLET ORAL DAILY
Qty: 90 TABLET | Refills: 1 | Status: SHIPPED | OUTPATIENT
Start: 2021-11-29 | End: 2021-12-01 | Stop reason: SDUPTHER

## 2021-11-29 RX ORDER — AMOXICILLIN 500 MG/1
500 TABLET, FILM COATED ORAL EVERY 12 HOURS
Qty: 20 TABLET | Refills: 0 | Status: SHIPPED | OUTPATIENT
Start: 2021-11-29 | End: 2021-12-09

## 2021-11-29 RX ORDER — PHENTERMINE HYDROCHLORIDE 37.5 MG/1
37.5 TABLET ORAL
Qty: 30 TABLET | Refills: 0 | Status: SHIPPED | OUTPATIENT
Start: 2021-11-29 | End: 2022-02-07 | Stop reason: SDUPTHER

## 2021-11-29 RX ORDER — CETIRIZINE HYDROCHLORIDE 10 MG/1
10 TABLET ORAL DAILY
Qty: 90 TABLET | Refills: 3 | Status: SHIPPED | OUTPATIENT
Start: 2021-11-29 | End: 2022-06-22 | Stop reason: SDUPTHER

## 2021-11-29 RX ORDER — SEMAGLUTIDE 1.34 MG/ML
0.5 INJECTION, SOLUTION SUBCUTANEOUS
Qty: 3 PEN | Refills: 1
Start: 2021-11-29 | End: 2022-02-07 | Stop reason: SDUPTHER

## 2021-11-29 RX ORDER — METHYLPREDNISOLONE ACETATE 40 MG/ML
40 INJECTION, SUSPENSION INTRA-ARTICULAR; INTRALESIONAL; INTRAMUSCULAR; SOFT TISSUE
Status: COMPLETED | OUTPATIENT
Start: 2021-11-29 | End: 2021-11-29

## 2021-11-29 RX ORDER — FUROSEMIDE 20 MG/1
20 TABLET ORAL DAILY
COMMUNITY
Start: 2021-11-05 | End: 2021-11-29 | Stop reason: SDUPTHER

## 2021-11-29 RX ORDER — TIZANIDINE 4 MG/1
4 TABLET ORAL 3 TIMES DAILY
Qty: 90 TABLET | Refills: 5 | Status: SHIPPED | OUTPATIENT
Start: 2021-11-29 | End: 2023-03-31 | Stop reason: SDUPTHER

## 2021-11-29 RX ADMIN — CEFTRIAXONE 1 G: 1 INJECTION, POWDER, FOR SOLUTION INTRAMUSCULAR; INTRAVENOUS at 11:11

## 2021-11-29 RX ADMIN — METHYLPREDNISOLONE ACETATE 40 MG: 40 INJECTION, SUSPENSION INTRA-ARTICULAR; INTRALESIONAL; INTRAMUSCULAR; SOFT TISSUE at 11:11

## 2021-11-30 ENCOUNTER — OFFICE VISIT (OUTPATIENT)
Dept: DERMATOLOGY | Facility: CLINIC | Age: 66
End: 2021-11-30
Payer: COMMERCIAL

## 2021-11-30 VITALS — RESPIRATION RATE: 18 BRPM | WEIGHT: 287 LBS | HEIGHT: 66 IN | BODY MASS INDEX: 46.12 KG/M2

## 2021-11-30 DIAGNOSIS — L91.0 KELOID CICATRIX: ICD-10-CM

## 2021-11-30 PROCEDURE — 11900 INJECT SKIN LESIONS </W 7: CPT | Mod: ,,, | Performed by: STUDENT IN AN ORGANIZED HEALTH CARE EDUCATION/TRAINING PROGRAM

## 2021-11-30 PROCEDURE — 99203 OFFICE O/P NEW LOW 30 MIN: CPT | Mod: 25,,, | Performed by: STUDENT IN AN ORGANIZED HEALTH CARE EDUCATION/TRAINING PROGRAM

## 2021-11-30 PROCEDURE — 11900 PR INJECTION INTO SKIN LESIONS, UP TO 7: ICD-10-PCS | Mod: ,,, | Performed by: STUDENT IN AN ORGANIZED HEALTH CARE EDUCATION/TRAINING PROGRAM

## 2021-11-30 PROCEDURE — 99203 PR OFFICE/OUTPT VISIT, NEW, LEVL III, 30-44 MIN: ICD-10-PCS | Mod: 25,,, | Performed by: STUDENT IN AN ORGANIZED HEALTH CARE EDUCATION/TRAINING PROGRAM

## 2021-11-30 RX ORDER — TRIAMCINOLONE ACETONIDE 40 MG/ML
40 INJECTION, SUSPENSION INTRA-ARTICULAR; INTRAMUSCULAR
Status: COMPLETED | OUTPATIENT
Start: 2021-11-30 | End: 2021-11-30

## 2021-11-30 RX ADMIN — TRIAMCINOLONE ACETONIDE 40 MG: 40 INJECTION, SUSPENSION INTRA-ARTICULAR; INTRAMUSCULAR at 03:11

## 2021-12-01 DIAGNOSIS — I10 ESSENTIAL HYPERTENSION: ICD-10-CM

## 2021-12-01 RX ORDER — CLOBETASOL PROPIONATE 0.5 MG/G
OINTMENT TOPICAL 2 TIMES DAILY
Qty: 60 G | Refills: 5 | Status: SHIPPED | OUTPATIENT
Start: 2021-12-01 | End: 2022-06-22 | Stop reason: SDUPTHER

## 2021-12-01 RX ORDER — ATENOLOL AND CHLORTHALIDONE TABLET 50; 25 MG/1; MG/1
1 TABLET ORAL DAILY
Qty: 90 TABLET | Refills: 1 | Status: SHIPPED | OUTPATIENT
Start: 2021-12-01 | End: 2022-02-07 | Stop reason: SDUPTHER

## 2021-12-02 ENCOUNTER — TELEPHONE (OUTPATIENT)
Dept: DERMATOLOGY | Facility: CLINIC | Age: 66
End: 2021-12-02
Payer: COMMERCIAL

## 2022-01-29 DIAGNOSIS — R42 DIZZINESS: ICD-10-CM

## 2022-02-01 RX ORDER — MECLIZINE HYDROCHLORIDE 25 MG/1
TABLET ORAL
Qty: 270 TABLET | Refills: 1 | Status: SHIPPED | OUTPATIENT
Start: 2022-02-01 | End: 2023-09-06

## 2022-02-02 ENCOUNTER — TELEPHONE (OUTPATIENT)
Dept: FAMILY MEDICINE | Facility: CLINIC | Age: 67
End: 2022-02-02
Payer: COMMERCIAL

## 2022-02-02 NOTE — TELEPHONE ENCOUNTER
"Patient called stating she was seen at Urgent Care yesterday for an UTI. She was given Bactrim. She states she is not feeling any better and is requesting to be put in the hospital for a "drip". Explained to patient that it will probably take her a few days to start feeling better since she just started antibiotic yesterday.Patient states she "knows her body and she needs a drip".Patient has an appointment on 02/07/2022. Explained to patient that just because she has an UTI does not necessarily mean that she will be admitted to the hospital. Explained that would be determined by her culture results and if she was not improving after a few days of being on medication.Patient stated that she was "dizzy and not feeling good",instructed patient to go to ER if she was feeling dizzy. She stated she did not want to go to the ER and she needed a "drip". Offered patient a sooner appointment with first available provider but patient stated she wanted to see Dr. Benitez not anyone else. Checked for a sooner appointment with Dr. Benitez and patient was given an appointment for 02/04/2022 and that she would need to have Urgent Care send us her lab results. Patient voiced understanding.  "

## 2022-02-07 ENCOUNTER — OFFICE VISIT (OUTPATIENT)
Dept: FAMILY MEDICINE | Facility: CLINIC | Age: 67
End: 2022-02-07
Payer: COMMERCIAL

## 2022-02-07 VITALS
OXYGEN SATURATION: 98 % | RESPIRATION RATE: 22 BRPM | HEIGHT: 66 IN | HEART RATE: 68 BPM | DIASTOLIC BLOOD PRESSURE: 78 MMHG | WEIGHT: 280 LBS | SYSTOLIC BLOOD PRESSURE: 124 MMHG | BODY MASS INDEX: 45 KG/M2

## 2022-02-07 DIAGNOSIS — E66.9 OBESITY, UNSPECIFIED CLASSIFICATION, UNSPECIFIED OBESITY TYPE, UNSPECIFIED WHETHER SERIOUS COMORBIDITY PRESENT: ICD-10-CM

## 2022-02-07 DIAGNOSIS — I10 ESSENTIAL HYPERTENSION: ICD-10-CM

## 2022-02-07 DIAGNOSIS — E03.9 HYPOTHYROIDISM, UNSPECIFIED TYPE: ICD-10-CM

## 2022-02-07 DIAGNOSIS — M54.9 BACK PAIN, UNSPECIFIED BACK LOCATION, UNSPECIFIED BACK PAIN LATERALITY, UNSPECIFIED CHRONICITY: ICD-10-CM

## 2022-02-07 DIAGNOSIS — J32.9 SINUSITIS, UNSPECIFIED CHRONICITY, UNSPECIFIED LOCATION: ICD-10-CM

## 2022-02-07 DIAGNOSIS — H60.91 OTITIS EXTERNA OF RIGHT EAR, UNSPECIFIED CHRONICITY, UNSPECIFIED TYPE: Primary | ICD-10-CM

## 2022-02-07 PROCEDURE — 3008F BODY MASS INDEX DOCD: CPT | Mod: ,,, | Performed by: FAMILY MEDICINE

## 2022-02-07 PROCEDURE — 3008F PR BODY MASS INDEX (BMI) DOCUMENTED: ICD-10-PCS | Mod: ,,, | Performed by: FAMILY MEDICINE

## 2022-02-07 PROCEDURE — 3074F SYST BP LT 130 MM HG: CPT | Mod: ,,, | Performed by: FAMILY MEDICINE

## 2022-02-07 PROCEDURE — 1159F PR MEDICATION LIST DOCUMENTED IN MEDICAL RECORD: ICD-10-PCS | Mod: ,,, | Performed by: FAMILY MEDICINE

## 2022-02-07 PROCEDURE — 3078F PR MOST RECENT DIASTOLIC BLOOD PRESSURE < 80 MM HG: ICD-10-PCS | Mod: ,,, | Performed by: FAMILY MEDICINE

## 2022-02-07 PROCEDURE — 1159F MED LIST DOCD IN RCRD: CPT | Mod: ,,, | Performed by: FAMILY MEDICINE

## 2022-02-07 PROCEDURE — 99214 PR OFFICE/OUTPT VISIT, EST, LEVL IV, 30-39 MIN: ICD-10-PCS | Mod: ,,, | Performed by: FAMILY MEDICINE

## 2022-02-07 PROCEDURE — 3074F PR MOST RECENT SYSTOLIC BLOOD PRESSURE < 130 MM HG: ICD-10-PCS | Mod: ,,, | Performed by: FAMILY MEDICINE

## 2022-02-07 PROCEDURE — 99214 OFFICE O/P EST MOD 30 MIN: CPT | Mod: ,,, | Performed by: FAMILY MEDICINE

## 2022-02-07 PROCEDURE — 3078F DIAST BP <80 MM HG: CPT | Mod: ,,, | Performed by: FAMILY MEDICINE

## 2022-02-07 RX ORDER — POTASSIUM CHLORIDE 750 MG/1
10 TABLET, EXTENDED RELEASE ORAL DAILY
Qty: 90 TABLET | Refills: 1 | Status: SHIPPED | OUTPATIENT
Start: 2022-02-07 | End: 2023-03-31 | Stop reason: SDUPTHER

## 2022-02-07 RX ORDER — BUPROPION HYDROCHLORIDE 150 MG/1
150 TABLET ORAL DAILY
Qty: 90 TABLET | Refills: 1 | Status: SHIPPED | OUTPATIENT
Start: 2022-02-07 | End: 2023-09-06

## 2022-02-07 RX ORDER — GABAPENTIN 100 MG/1
100 CAPSULE ORAL 3 TIMES DAILY
Qty: 90 CAPSULE | Refills: 5 | Status: SHIPPED | OUTPATIENT
Start: 2022-02-07 | End: 2022-06-22 | Stop reason: SDUPTHER

## 2022-02-07 RX ORDER — IPRATROPIUM BROMIDE 42 UG/1
2 SPRAY, METERED NASAL 4 TIMES DAILY
Qty: 15 ML | Refills: 5 | Status: SHIPPED | OUTPATIENT
Start: 2022-02-07 | End: 2022-06-22 | Stop reason: SDUPTHER

## 2022-02-07 RX ORDER — SEMAGLUTIDE 1.34 MG/ML
0.5 INJECTION, SOLUTION SUBCUTANEOUS
Qty: 3 PEN | Refills: 1
Start: 2022-02-07 | End: 2023-02-07

## 2022-02-07 RX ORDER — LEVOTHYROXINE SODIUM 125 UG/1
125 TABLET ORAL DAILY
Qty: 90 TABLET | Refills: 1 | Status: SHIPPED | OUTPATIENT
Start: 2022-02-07 | End: 2022-03-24 | Stop reason: SDUPTHER

## 2022-02-07 RX ORDER — SULFAMETHOXAZOLE AND TRIMETHOPRIM 800; 160 MG/1; MG/1
TABLET ORAL
COMMUNITY
Start: 2022-02-01 | End: 2022-03-24 | Stop reason: ALTCHOICE

## 2022-02-07 RX ORDER — ATENOLOL AND CHLORTHALIDONE TABLET 50; 25 MG/1; MG/1
1 TABLET ORAL DAILY
Qty: 90 TABLET | Refills: 1 | Status: SHIPPED | OUTPATIENT
Start: 2022-02-07 | End: 2022-06-22 | Stop reason: SDUPTHER

## 2022-02-07 RX ORDER — PHENTERMINE HYDROCHLORIDE 37.5 MG/1
37.5 TABLET ORAL
Qty: 30 TABLET | Refills: 0 | Status: SHIPPED | OUTPATIENT
Start: 2022-02-07 | End: 2022-06-22 | Stop reason: SDUPTHER

## 2022-02-07 RX ORDER — SERTRALINE HYDROCHLORIDE 50 MG/1
50 TABLET, FILM COATED ORAL DAILY
Qty: 30 TABLET | Refills: 5 | Status: SHIPPED | OUTPATIENT
Start: 2022-02-07 | End: 2022-06-22 | Stop reason: SDUPTHER

## 2022-02-07 RX ORDER — CETIRIZINE HYDROCHLORIDE 10 MG/1
10 TABLET ORAL DAILY
Qty: 30 TABLET | Refills: 2 | Status: SHIPPED | OUTPATIENT
Start: 2022-02-07 | End: 2023-02-07

## 2022-02-07 RX ORDER — DULAGLUTIDE 1.5 MG/.5ML
INJECTION, SOLUTION SUBCUTANEOUS
COMMUNITY
Start: 2022-01-24 | End: 2023-06-12 | Stop reason: SDUPTHER

## 2022-02-07 NOTE — ASSESSMENT & PLAN NOTE
Will treat the right external canal with Cipro otic suspension.  Will follow-up on a p.r.n. basis.  She is to use it at least 3 times a day in the external canal.

## 2022-02-07 NOTE — ASSESSMENT & PLAN NOTE
Patient's primarily in today for follow-up on her obesity.  We did refill her phentermine after we reviewed her .  No evidence of abuse.  Discussed weight loss with her again.  She is to follow up on a monthly basis.  Will also continue to use was in PICC to help her with

## 2022-02-07 NOTE — PROGRESS NOTES
Meek Benitez DO   [facility]  [address]  [place]  [address]      PATIENT NAME: Jeanie Diamond  : 1955  DATE: 22  MRN: [id number]      Billing Provider: Meek Benitez DO  Level of Service:   Patient PCP Information     Provider PCP Type    Meek Benitez DO General          Reason for Visit / Chief Complaint: Hypertension, Obesity (Patient requesting refills on medications. Patient has questions about her medications. She has been on Trulicity,but she was given Ozempic samples last office visit.), and Urinary Tract Infection (Patient was treated for UTI at urgent care last week. She is currently on Bactrim. Someone called from Urgent Care yesterday and patient was told she had E.coli in her culture.)       Update PCP  Update Chief Complaint         History of Present Illness / Problem Focused Workflow     Jeanie Diamond presents to the clinic with Hypertension, Obesity (Patient requesting refills on medications. Patient has questions about her medications. She has been on Trulicity,but she was given Ozempic samples last office visit.), and Urinary Tract Infection (Patient was treated for UTI at urgent care last week. She is currently on Bactrim. Someone called from Urgent Care yesterday and patient was told she had E.coli in her culture.)     Patient had a recent urinary tract infections being treated with Bactrim DS and it grew out E coli.  She is not have any abdominal pain nausea vomiting or back pain at this time.  She is in today for follow-up on her obesity.  Which wished her from Trulicity to Ozempic at her last visit she tolerated that well.  She needs refill on that medication as well as phentermine.  She denies any chest pain shortness of breath palpitations PND orthopnea.  Patient also complains of drainage from her right ear canal.      Review of Systems     Review of Systems   Constitutional: Negative for activity change, appetite change, chills, fatigue  and fever.   HENT: Negative for nasal congestion, ear discharge, ear pain, mouth dryness, mouth sores, postnasal drip, sinus pressure/congestion, sore throat and voice change.    Eyes: Negative for pain, discharge, redness, itching and visual disturbance.   Respiratory: Negative for apnea, cough, chest tightness, shortness of breath and wheezing.    Cardiovascular: Negative for chest pain, palpitations and leg swelling.   Gastrointestinal: Negative for abdominal distention, abdominal pain, anal bleeding, blood in stool, change in bowel habit, constipation, diarrhea, nausea, vomiting, reflux and change in bowel habit.   Endocrine: Negative for cold intolerance, heat intolerance, polydipsia, polyphagia and polyuria.   Genitourinary: Positive for difficulty urinating and urgency. Negative for enuresis, erectile dysfunction, frequency, genital sores, hematuria, hot flashes, menstrual irregularity and vaginal dryness.   Musculoskeletal: Negative for arthralgias, back pain, gait problem, leg pain, myalgias and neck pain.   Integumentary:  Negative for rash, mole/lesion, breast mass, breast discharge and breast tenderness.   Allergic/Immunologic: Negative for environmental allergies and food allergies.   Neurological: Negative for dizziness, vertigo, tremors, seizures, syncope, facial asymmetry, speech difficulty, weakness, light-headedness, numbness, headaches, disturbances in coordination, memory loss and coordination difficulties.   Hematological: Negative for adenopathy. Does not bruise/bleed easily.   Psychiatric/Behavioral: Negative for agitation, behavioral problems, confusion, decreased concentration, dysphoric mood, hallucinations, self-injury, sleep disturbance and suicidal ideas. The patient is not nervous/anxious and is not hyperactive.    Breast: Negative for mass and tenderness      Medical / Social / Family History     Past Medical History:   Diagnosis Date    Depressive disorder     Essential hypertension      Hypothyroidism     Low back pain     Obesity     Other chronic pain     Pain in left knee     Personal history of colonic polyps     Unilateral primary osteoarthritis, left knee        Past Surgical History:   Procedure Laterality Date    COLONOSCOPY  04/28/2015    HYSTERECTOMY  1996    ROTATOR CUFF REPAIR Right     x 2    TOTAL KNEE ARTHROPLASTY Left 07/2021    Dr. Irving Benitez       Social History  Ms.  reports that she quit smoking about 9 months ago. Her smoking use included cigarettes. She started smoking about 35 years ago. She has never used smokeless tobacco. She reports current alcohol use. She reports that she does not use drugs.    Family History  Ms.'s family history includes Diabetes in her sister.    Medications and Allergies     Medications  Outpatient Medications Marked as Taking for the 2/7/22 encounter (Office Visit) with Meek Benitez, DO   Medication Sig Dispense Refill    cetirizine (ZYRTEC) 10 MG tablet Take 1 tablet (10 mg total) by mouth once daily. 90 tablet 3    clobetasol 0.05% (TEMOVATE) 0.05 % Oint Apply topically 2 (two) times daily. 60 g 5    diclofenac sodium (VOLTAREN) 1 % Gel APPLY TO THE AFFECTED AREA(S) FOUR TIMES DAILY FOR 30 DAYS 100 g 5    furosemide (LASIX) 20 MG tablet Take 1 tablet (20 mg total) by mouth once daily. 30 tablet 5    HYDROcodone-acetaminophen (NORCO)  mg per tablet Pt has cut back to 2 daily until after surgery      ibuprofen (ADVIL,MOTRIN) 800 MG tablet Take 1 tablet (800 mg total) by mouth 3 (three) times daily. 90 tablet 5    meclizine (ANTIVERT) 25 mg tablet TAKE ONE TABLET BY MOUTH THREE TIMES DAILY 270 tablet 1    sulfamethoxazole-trimethoprim 800-160mg (BACTRIM DS) 800-160 mg Tab TAKE ONE TABLET BY MOUTH EVERY TWELVE HOURS UNTIL GONE      tiZANidine (ZANAFLEX) 4 MG tablet Take 1 tablet (4 mg total) by mouth 3 (three) times daily. 90 tablet 5    TRULICITY 1.5 mg/0.5 mL pen injector INJECT SUBCUTANEOUSLY 1.5 MG (0.5  milliliters) ONCE A WEEK      [DISCONTINUED] atenoloL-chlorthalidone (TENORETIC) 50-25 mg Tab Take 1 tablet by mouth once daily. 90 tablet 1    [DISCONTINUED] buPROPion (WELLBUTRIN XL) 150 MG TB24 tablet Take 1 tablet (150 mg total) by mouth once daily. 90 tablet 1    [DISCONTINUED] gabapentin (NEURONTIN) 100 MG capsule Take 1 capsule (100 mg total) by mouth 3 (three) times daily. 90 capsule 5    [DISCONTINUED] ipratropium (ATROVENT) 42 mcg (0.06 %) nasal spray 2 sprays by Nasal route 4 (four) times daily. 15 mL 5    [DISCONTINUED] levothyroxine (SYNTHROID) 125 MCG tablet Take 1 tablet (125 mcg total) by mouth once daily. 90 tablet 1    [DISCONTINUED] phentermine (ADIPEX-P) 37.5 mg tablet Take 1 tablet (37.5 mg total) by mouth before breakfast. 30 tablet 0    [DISCONTINUED] potassium chloride (KLOR-CON) 10 MEQ TbSR Take 1 tablet (10 mEq total) by mouth once daily. 90 tablet 1    [DISCONTINUED] semaglutide (OZEMPIC) 0.25 mg or 0.5 mg(2 mg/1.5 mL) pen injector Inject 0.5 mg into the skin every 7 days. 3 pen 1    [DISCONTINUED] sertraline (ZOLOFT) 50 MG tablet Take 1 tablet (50 mg total) by mouth once daily. 30 tablet 5       Allergies  Review of patient's allergies indicates:   Allergen Reactions    Macrolide antibiotics        Physical Examination     Vitals:    02/07/22 1013   BP: 124/78   Pulse: 68   Resp: (!) 22     Physical Exam  Vitals reviewed.   Constitutional:       Appearance: Normal appearance. She is normal weight.   HENT:      Head: Normocephalic and atraumatic.      Ears:      Comments: Right TM is not seen well with ear canal being swollen and yellowish discharge in the canal itself.  Left TM looks fine.  Throat without any lesions.  No adenopathy noted.     Nose: Nose normal. No congestion or rhinorrhea.      Mouth/Throat:      Mouth: Mucous membranes are moist.      Pharynx: Oropharynx is clear. No oropharyngeal exudate or posterior oropharyngeal erythema.   Eyes:      General: No scleral  icterus.     Extraocular Movements: Extraocular movements intact.      Conjunctiva/sclera: Conjunctivae normal.      Pupils: Pupils are equal, round, and reactive to light.   Neck:      Vascular: No carotid bruit.   Cardiovascular:      Rate and Rhythm: Normal rate and regular rhythm.      Pulses: Normal pulses.      Heart sounds: Normal heart sounds. No murmur heard.  No friction rub. No gallop.    Pulmonary:      Effort: Pulmonary effort is normal. No respiratory distress.      Breath sounds: Normal breath sounds.   Abdominal:      General: Abdomen is flat. Bowel sounds are normal. There is no distension.      Palpations: Abdomen is soft.      Tenderness: There is no abdominal tenderness. There is no guarding.   Musculoskeletal:         General: Normal range of motion.      Cervical back: Normal range of motion and neck supple.      Right lower leg: No edema.      Left lower leg: No edema.   Lymphadenopathy:      Cervical: No cervical adenopathy.   Skin:     General: Skin is warm and dry.      Capillary Refill: Capillary refill takes less than 2 seconds.      Coloration: Skin is not jaundiced.      Findings: No lesion.   Neurological:      General: No focal deficit present.      Mental Status: She is alert and oriented to person, place, and time. Mental status is at baseline.      Cranial Nerves: No cranial nerve deficit.      Sensory: No sensory deficit.      Motor: No weakness.      Coordination: Coordination normal.      Gait: Gait normal.      Deep Tendon Reflexes: Reflexes normal.   Psychiatric:         Mood and Affect: Mood normal.         Behavior: Behavior normal.         Thought Content: Thought content normal.         Judgment: Judgment normal.     She          Lab Results   Component Value Date    WBC 6.30 10/06/2021    HGB 13.6 10/06/2021    HCT 40.5 10/06/2021    MCV 87.5 10/06/2021     10/06/2021          Sodium   Date Value Ref Range Status   10/06/2021 139 136 - 145 mmol/L Final      Potassium   Date Value Ref Range Status   10/06/2021 3.0 (L) 3.5 - 5.1 mmol/L Final     Chloride   Date Value Ref Range Status   10/06/2021 102 98 - 107 mmol/L Final     CO2   Date Value Ref Range Status   10/06/2021 31 21 - 32 mmol/L Final     Glucose   Date Value Ref Range Status   10/06/2021 96 74 - 106 mg/dL Final     BUN   Date Value Ref Range Status   10/06/2021 13 7 - 18 mg/dL Final     Creatinine   Date Value Ref Range Status   10/06/2021 0.98 0.55 - 1.02 mg/dL Final     Calcium   Date Value Ref Range Status   10/06/2021 9.7 8.5 - 10.1 mg/dL Final     Total Protein   Date Value Ref Range Status   10/06/2021 7.8 6.4 - 8.2 g/dL Final     Albumin   Date Value Ref Range Status   10/06/2021 3.4 (L) 3.5 - 5.0 g/dL Final     Bilirubin, Total   Date Value Ref Range Status   10/06/2021 0.4 >0.0 - 1.2 mg/dL Final     Alk Phos   Date Value Ref Range Status   10/06/2021 83 50 - 130 U/L Final     AST   Date Value Ref Range Status   10/06/2021 16 15 - 37 U/L Final     ALT   Date Value Ref Range Status   10/06/2021 17 13 - 56 U/L Final     Anion Gap   Date Value Ref Range Status   10/06/2021 9 7 - 16 mmol/L Final     eGFR    Date Value Ref Range Status   10/06/2021 73 >=60 mL/min/1.73m² Final      X-Ray Ankle Complete 3 View Left  Narrative: EXAMINATION:  XR ANKLE COMPLETE 3 VIEW LEFT    CLINICAL HISTORY:  Pain in unspecified ankle and joints of unspecified foot    TECHNIQUE:  AP, lateral and oblique views of the left ankle were performed.    COMPARISON:  None    FINDINGS:  There is prominent soft tissue swelling.  There are moderately severe degenerative changes at the ankle mortise and a large spur at the plantar aspect of the calcaneus.  No fracture or dislocation.  Impression: Soft tissue swelling and moderately severe osteoarthritis.    Electronically signed by: Bridgette Atkinson  Date:    08/05/2021  Time:    12:22     Procedures   Assessment and Plan (including Health Maintenance)      Problem List   Smart Sets  Document Outside HM   :    Plan:         Health Maintenance Due   Topic Date Due    Hepatitis C Screening  Never done    HIV Screening  Never done    TETANUS VACCINE  Never done    DEXA SCAN  Never done    Colorectal Cancer Screening  Never done    Shingles Vaccine (1 of 2) Never done    COVID-19 Vaccine (2 - Moderna 3-dose series) 06/16/2021    Pneumococcal Vaccines (Age 65+) (1 of 1 - PPSV23) Never done       Problem List Items Addressed This Visit        ENT    Sinusitis    Current Assessment & Plan     Patient has chronic allergy problems and takes Zyrtec for this problem.  Refill the medicines today.  Consider Atrovent nasal spray.  Follow-up in 6 months.         Relevant Medications    ipratropium (ATROVENT) 42 mcg (0.06 %) nasal spray    cetirizine (ZYRTEC) 10 MG tablet    Otitis externa of right ear - Primary    Current Assessment & Plan     Will treat the right external canal with Cipro otic suspension.  Will follow-up on a p.r.n. basis.  She is to use it at least 3 times a day in the external canal.         Relevant Medications    ciprofloxacin-hydrocortisone 0.2-1% (CIPRO HC OTIC) otic suspension       Cardiac/Vascular    Essential hypertension    Current Assessment & Plan     Blood pressure well controlled today.  No change in medication.  Follow-up in 3 months.         Relevant Medications    potassium chloride (KLOR-CON) 10 MEQ TbSR    atenoloL-chlorthalidone (TENORETIC) 50-25 mg Tab       Endocrine    Obesity    Current Assessment & Plan     Patient's primarily in today for follow-up on her obesity.  We did refill her phentermine after we reviewed her .  No evidence of abuse.  Discussed weight loss with her again.  She is to follow up on a monthly basis.  Will also continue to use was in [facility] to help her with         Relevant Medications    semaglutide (OZEMPIC) 0.25 mg or 0.5 mg(2 mg/1.5 mL) pen injector    phentermine (ADIPEX-P) 37.5 mg tablet    Hypothyroidism    Current  Assessment & Plan     Clinically euthyroid.  No change in medications.  No labs today.  Follow-up p.r.n..         Relevant Medications    levothyroxine (SYNTHROID) 125 MCG tablet       Orthopedic    Back pain    Relevant Medications    gabapentin (NEURONTIN) 100 MG capsule    sertraline (ZOLOFT) 50 MG tablet    buPROPion (WELLBUTRIN XL) 150 MG TB24 tablet          Health Maintenance Topics with due status: Not Due       Topic Last Completion Date    Lipid Panel 10/06/2021    Mammogram 11/16/2021       Future Appointments   Date Time Provider Department Center   3/24/2022  1:[facility] [place]        Follow up in about 4 weeks (around 3/7/2022).     Signature:  [facility]  [address] [place] [address], [place]  [address]    Date of encounter: 2/7/22

## 2022-02-07 NOTE — ASSESSMENT & PLAN NOTE
Patient has chronic allergy problems and takes Zyrtec for this problem.  Refill the medicines today.  Consider Atrovent nasal spray.  Follow-up in 6 months.

## 2022-03-24 ENCOUNTER — OFFICE VISIT (OUTPATIENT)
Dept: FAMILY MEDICINE | Facility: CLINIC | Age: 67
End: 2022-03-24
Payer: COMMERCIAL

## 2022-03-24 VITALS
SYSTOLIC BLOOD PRESSURE: 114 MMHG | HEART RATE: 68 BPM | HEIGHT: 66 IN | DIASTOLIC BLOOD PRESSURE: 82 MMHG | BODY MASS INDEX: 46.18 KG/M2 | OXYGEN SATURATION: 99 % | RESPIRATION RATE: 18 BRPM | WEIGHT: 287.38 LBS | TEMPERATURE: 98 F

## 2022-03-24 DIAGNOSIS — E03.9 HYPOTHYROIDISM, UNSPECIFIED TYPE: ICD-10-CM

## 2022-03-24 DIAGNOSIS — E66.09 OBESITY DUE TO EXCESS CALORIES WITH SERIOUS COMORBIDITY, UNSPECIFIED CLASSIFICATION: ICD-10-CM

## 2022-03-24 DIAGNOSIS — N39.41 URGE INCONTINENCE OF URINE: ICD-10-CM

## 2022-03-24 DIAGNOSIS — E87.6 HYPOKALEMIA: ICD-10-CM

## 2022-03-24 DIAGNOSIS — I10 ESSENTIAL HYPERTENSION: ICD-10-CM

## 2022-03-24 DIAGNOSIS — D51.3 OTHER DIETARY VITAMIN B12 DEFICIENCY ANEMIA: Primary | ICD-10-CM

## 2022-03-24 DIAGNOSIS — R73.9 HYPERGLYCEMIA: ICD-10-CM

## 2022-03-24 LAB
ANION GAP SERPL CALCULATED.3IONS-SCNC: 10 MMOL/L (ref 7–16)
BUN SERPL-MCNC: 12 MG/DL (ref 7–18)
BUN/CREAT SERPL: 13 (ref 6–20)
CALCIUM SERPL-MCNC: 9.3 MG/DL (ref 8.5–10.1)
CHLORIDE SERPL-SCNC: 103 MMOL/L (ref 98–107)
CO2 SERPL-SCNC: 29 MMOL/L (ref 21–32)
CREAT SERPL-MCNC: 0.96 MG/DL (ref 0.55–1.02)
EST. AVERAGE GLUCOSE BLD GHB EST-MCNC: 110 MG/DL
FOLATE SERPL-MCNC: 9.8 NG/ML (ref 3.1–17.5)
GLUCOSE SERPL-MCNC: 87 MG/DL (ref 74–106)
HBA1C MFR BLD HPLC: 5.9 % (ref 4.5–6.6)
MAGNESIUM SERPL-MCNC: 2.5 MG/DL (ref 1.7–2.3)
POTASSIUM SERPL-SCNC: 3.5 MMOL/L (ref 3.5–5.1)
SODIUM SERPL-SCNC: 138 MMOL/L (ref 136–145)
VIT B12 SERPL-MCNC: 811 PG/ML (ref 193–986)

## 2022-03-24 PROCEDURE — 99214 PR OFFICE/OUTPT VISIT, EST, LEVL IV, 30-39 MIN: ICD-10-PCS | Mod: 25,,, | Performed by: FAMILY MEDICINE

## 2022-03-24 PROCEDURE — 80048 BASIC METABOLIC PNL TOTAL CA: CPT | Mod: ,,, | Performed by: CLINICAL MEDICAL LABORATORY

## 2022-03-24 PROCEDURE — 1101F PR PT FALLS ASSESS DOC 0-1 FALLS W/OUT INJ PAST YR: ICD-10-PCS | Mod: ,,, | Performed by: FAMILY MEDICINE

## 2022-03-24 PROCEDURE — 3008F BODY MASS INDEX DOCD: CPT | Mod: ,,, | Performed by: FAMILY MEDICINE

## 2022-03-24 PROCEDURE — 3079F DIAST BP 80-89 MM HG: CPT | Mod: ,,, | Performed by: FAMILY MEDICINE

## 2022-03-24 PROCEDURE — 82607 VITAMIN B12/FOLATE, SERUM PANEL: ICD-10-PCS | Mod: ,,, | Performed by: CLINICAL MEDICAL LABORATORY

## 2022-03-24 PROCEDURE — 96372 PR INJECTION,THERAP/PROPH/DIAG2ST, IM OR SUBCUT: ICD-10-PCS | Mod: ,,, | Performed by: FAMILY MEDICINE

## 2022-03-24 PROCEDURE — 82746 ASSAY OF FOLIC ACID SERUM: CPT | Mod: ,,, | Performed by: CLINICAL MEDICAL LABORATORY

## 2022-03-24 PROCEDURE — 3074F SYST BP LT 130 MM HG: CPT | Mod: ,,, | Performed by: FAMILY MEDICINE

## 2022-03-24 PROCEDURE — 1160F RVW MEDS BY RX/DR IN RCRD: CPT | Mod: ,,, | Performed by: FAMILY MEDICINE

## 2022-03-24 PROCEDURE — 83735 ASSAY OF MAGNESIUM: CPT | Mod: ,,, | Performed by: CLINICAL MEDICAL LABORATORY

## 2022-03-24 PROCEDURE — 83735 MAGNESIUM: ICD-10-PCS | Mod: ,,, | Performed by: CLINICAL MEDICAL LABORATORY

## 2022-03-24 PROCEDURE — 3008F PR BODY MASS INDEX (BMI) DOCUMENTED: ICD-10-PCS | Mod: ,,, | Performed by: FAMILY MEDICINE

## 2022-03-24 PROCEDURE — 1159F MED LIST DOCD IN RCRD: CPT | Mod: ,,, | Performed by: FAMILY MEDICINE

## 2022-03-24 PROCEDURE — 1101F PT FALLS ASSESS-DOCD LE1/YR: CPT | Mod: ,,, | Performed by: FAMILY MEDICINE

## 2022-03-24 PROCEDURE — 83036 HEMOGLOBIN GLYCOSYLATED A1C: CPT | Mod: ,,, | Performed by: CLINICAL MEDICAL LABORATORY

## 2022-03-24 PROCEDURE — 82746 VITAMIN B12/FOLATE, SERUM PANEL: ICD-10-PCS | Mod: ,,, | Performed by: CLINICAL MEDICAL LABORATORY

## 2022-03-24 PROCEDURE — 3074F PR MOST RECENT SYSTOLIC BLOOD PRESSURE < 130 MM HG: ICD-10-PCS | Mod: ,,, | Performed by: FAMILY MEDICINE

## 2022-03-24 PROCEDURE — 96372 THER/PROPH/DIAG INJ SC/IM: CPT | Mod: ,,, | Performed by: FAMILY MEDICINE

## 2022-03-24 PROCEDURE — 99214 OFFICE O/P EST MOD 30 MIN: CPT | Mod: 25,,, | Performed by: FAMILY MEDICINE

## 2022-03-24 PROCEDURE — 82607 VITAMIN B-12: CPT | Mod: ,,, | Performed by: CLINICAL MEDICAL LABORATORY

## 2022-03-24 PROCEDURE — 3079F PR MOST RECENT DIASTOLIC BLOOD PRESSURE 80-89 MM HG: ICD-10-PCS | Mod: ,,, | Performed by: FAMILY MEDICINE

## 2022-03-24 PROCEDURE — 3288F PR FALLS RISK ASSESSMENT DOCUMENTED: ICD-10-PCS | Mod: ,,, | Performed by: FAMILY MEDICINE

## 2022-03-24 PROCEDURE — 3044F PR MOST RECENT HEMOGLOBIN A1C LEVEL <7.0%: ICD-10-PCS | Mod: ,,, | Performed by: FAMILY MEDICINE

## 2022-03-24 PROCEDURE — 1160F PR REVIEW ALL MEDS BY PRESCRIBER/CLIN PHARMACIST DOCUMENTED: ICD-10-PCS | Mod: ,,, | Performed by: FAMILY MEDICINE

## 2022-03-24 PROCEDURE — 3288F FALL RISK ASSESSMENT DOCD: CPT | Mod: ,,, | Performed by: FAMILY MEDICINE

## 2022-03-24 PROCEDURE — 3044F HG A1C LEVEL LT 7.0%: CPT | Mod: ,,, | Performed by: FAMILY MEDICINE

## 2022-03-24 PROCEDURE — 83036 HEMOGLOBIN A1C: ICD-10-PCS | Mod: ,,, | Performed by: CLINICAL MEDICAL LABORATORY

## 2022-03-24 PROCEDURE — 1159F PR MEDICATION LIST DOCUMENTED IN MEDICAL RECORD: ICD-10-PCS | Mod: ,,, | Performed by: FAMILY MEDICINE

## 2022-03-24 PROCEDURE — 80048 BASIC METABOLIC PANEL: ICD-10-PCS | Mod: ,,, | Performed by: CLINICAL MEDICAL LABORATORY

## 2022-03-24 RX ORDER — MAGNESIUM CHLORIDE 71.5 G/G
2 TABLET ORAL DAILY
Qty: 60 TABLET | Refills: 5 | Status: SHIPPED | OUTPATIENT
Start: 2022-03-24 | End: 2023-09-06

## 2022-03-24 RX ORDER — LEVOTHYROXINE SODIUM 125 UG/1
125 TABLET ORAL DAILY
Qty: 90 TABLET | Refills: 1 | Status: SHIPPED | OUTPATIENT
Start: 2022-03-24 | End: 2022-06-22 | Stop reason: SDUPTHER

## 2022-03-24 RX ORDER — CYANOCOBALAMIN 1000 UG/ML
1000 INJECTION, SOLUTION INTRAMUSCULAR; SUBCUTANEOUS
Status: COMPLETED | OUTPATIENT
Start: 2022-03-24 | End: 2022-03-24

## 2022-03-24 RX ADMIN — CYANOCOBALAMIN 1000 MCG: 1000 INJECTION, SOLUTION INTRAMUSCULAR; SUBCUTANEOUS at 03:03

## 2022-03-24 NOTE — PROGRESS NOTES
The the   Meek Benitez DO   [address] [address]  [place]  [address]      PATIENT NAME: Jeanie Diamond  : 1955  DATE: 3/24/22  MRN: [id number]      Billing Provider: Meek Benitez DO  Level of Service:   Patient PCP Information     Provider PCP Type    Meek Benitez DO General          Reason for Visit / Chief Complaint: Urinary Incontinence (Patient has been having trouble with urinary incontinence for over 1 year. She would like to discuss referral to gynecologist or urologist.), Fatigue (C/O severe fatigue. Has had Vitamin B12 injections in the past,patient believes they seemed to help with fatigue.), Medication Problem (Patient prefers to take Zoloft instead of Wellbutrin. She has been taking Zoloft 100mg everyday instead of 50mg as chart reads. Patient did not bring medications to visit today.), and Obesity (Wants to get refills on weight loss medication but she has been gained 7 pounds since 22.)       Update PCP  Update Chief Complaint         History of Present Illness / Problem Focused Workflow     Jeanie Diamond presents to the clinic with Urinary Incontinence (Patient has been having trouble with urinary incontinence for over 1 year. She would like to discuss referral to gynecologist or urologist.), Fatigue (C/O severe fatigue. Has had Vitamin B12 injections in the past,patient believes they seemed to help with fatigue.), Medication Problem (Patient prefers to take Zoloft instead of Wellbutrin. She has been taking Zoloft 100mg everyday instead of 50mg as chart reads. Patient did not bring medications to visit today.), and Obesity (Wants to get refills on weight loss medication but she has been gained 7 pounds since 22.)     Patient presented today with for add a [place] however she is had multiple complaints.  Wanted medication for her weight loss.  She also is follow-up on her blood pressure.  She also takes multiple medications for chronic pain.  She  also has not lost weight after being on Adipex.        Review of Systems     Review of Systems   Constitutional: Positive for fatigue. Negative for activity change, appetite change, chills and fever.   HENT: Negative for nasal congestion, ear discharge, ear pain, mouth dryness, mouth sores, postnasal drip, sinus pressure/congestion, sore throat and voice change.    Eyes: Negative for pain, discharge, redness, itching and visual disturbance.   Respiratory: Positive for shortness of breath. Negative for apnea, cough, chest tightness and wheezing.    Cardiovascular: Negative for chest pain, palpitations and leg swelling.   Gastrointestinal: Negative for abdominal distention, abdominal pain, anal bleeding, blood in stool, change in bowel habit, constipation, diarrhea, nausea, vomiting, reflux and change in bowel habit.   Endocrine: Negative for cold intolerance, heat intolerance, polydipsia, polyphagia and polyuria.   Genitourinary: Positive for dysuria and urgency. Negative for difficulty urinating, enuresis, frequency, genital sores, hematuria, hot flashes, menstrual irregularity and vaginal dryness.   Musculoskeletal: Positive for back pain. Negative for arthralgias, gait problem, leg pain, myalgias and neck pain.   Integumentary:  Negative for rash, mole/lesion, breast mass, breast discharge and breast tenderness.   Allergic/Immunologic: Negative for environmental allergies and food allergies.   Neurological: Negative for dizziness, vertigo, tremors, seizures, syncope, facial asymmetry, speech difficulty, weakness, light-headedness, numbness, headaches, disturbances in coordination, memory loss and coordination difficulties.   Hematological: Negative for adenopathy. Does not bruise/bleed easily.   Psychiatric/Behavioral: Negative for agitation, behavioral problems, confusion, decreased concentration, dysphoric mood, hallucinations, self-injury, sleep disturbance and suicidal ideas. The patient is not nervous/anxious  and is not hyperactive.    Breast: Negative for mass and tenderness      Medical / Social / Family History     Past Medical History:   Diagnosis Date    Depressive disorder     Essential hypertension     Hypothyroidism     Low back pain     Obesity     Other chronic pain     Pain in left knee     Personal history of colonic polyps     Unilateral primary osteoarthritis, left knee        Past Surgical History:   Procedure Laterality Date    BILATERAL OOPHORECTOMY      COLONOSCOPY  04/28/2015    HYSTERECTOMY  1996    ROTATOR CUFF REPAIR Right     x 2    TOTAL KNEE ARTHROPLASTY Left 07/2021    Dr. Irving Benitez       Social History  Ms.  reports that she quit smoking about 10 months ago. Her smoking use included cigarettes. She started smoking about 35 years ago. She has never used smokeless tobacco. She reports current alcohol use. She reports that she does not use drugs.    Family History  Ms.'s family history includes Diabetes in her sister.    Medications and Allergies     Medications  Outpatient Medications Marked as Taking for the 3/24/22 encounter (Office Visit) with Meek Benitez, DO   Medication Sig Dispense Refill    atenoloL-chlorthalidone (TENORETIC) 50-25 mg Tab Take 1 tablet by mouth once daily. 90 tablet 1    cetirizine (ZYRTEC) 10 MG tablet Take 1 tablet (10 mg total) by mouth once daily. 90 tablet 3    clobetasol 0.05% (TEMOVATE) 0.05 % Oint Apply topically 2 (two) times daily. 60 g 5    diclofenac sodium (VOLTAREN) 1 % Gel APPLY TO THE AFFECTED AREA(S) FOUR TIMES DAILY FOR 30 DAYS 100 g 5    gabapentin (NEURONTIN) 100 MG capsule Take 1 capsule (100 mg total) by mouth 3 (three) times daily. 90 capsule 5    HYDROcodone-acetaminophen (NORCO)  mg per tablet Pt has cut back to 2 daily until after surgery      ibuprofen (ADVIL,MOTRIN) 800 MG tablet Take 1 tablet (800 mg total) by mouth 3 (three) times daily. 90 tablet 5    ipratropium (ATROVENT) 42 mcg (0.06 %) nasal spray 2  sprays by Nasal route 4 (four) times daily. 15 mL 5    meclizine (ANTIVERT) 25 mg tablet TAKE ONE TABLET BY MOUTH THREE TIMES DAILY 270 tablet 1    phentermine (ADIPEX-P) 37.5 mg tablet Take 1 tablet (37.5 mg total) by mouth before breakfast. 30 tablet 0    potassium chloride (KLOR-CON) 10 MEQ TbSR Take 1 tablet (10 mEq total) by mouth once daily. 90 tablet 1    semaglutide (OZEMPIC) 0.25 mg or 0.5 mg(2 mg/1.5 mL) pen injector Inject 0.5 mg into the skin every 7 days. 3 pen 1    sertraline (ZOLOFT) 50 MG tablet Take 1 tablet (50 mg total) by mouth once daily. 30 tablet 5    tiZANidine (ZANAFLEX) 4 MG tablet Take 1 tablet (4 mg total) by mouth 3 (three) times daily. 90 tablet 5    TRULICITY 1.5 mg/0.5 mL pen injector INJECT SUBCUTANEOUSLY 1.5 MG (0.5 milliliters) ONCE A WEEK      [DISCONTINUED] levothyroxine (SYNTHROID) 125 MCG tablet Take 1 tablet (125 mcg total) by mouth once daily. 90 tablet 1     Current Facility-Administered Medications for the 3/24/22 encounter (Office Visit) with Meek Benitez DO   Medication Dose Route Frequency Provider Last Rate Last Admin    [COMPLETED] cyanocobalamin injection 1,000 mcg  1,000 mcg Intramuscular 1 time in Clinic/HOD Meek Benitez DO   1,000 mcg at 03/24/22 1500       Allergies  Review of patient's allergies indicates:   Allergen Reactions    Macrolide antibiotics        Physical Examination     Vitals:    03/24/22 1312   BP: 114/82   Pulse: 68   Resp: 18   Temp: 97.5 °F (36.4 °C)     Physical Exam  Vitals reviewed.   Constitutional:       General: She is not in acute distress.     Appearance: Normal appearance. She is obese.      Comments: Morbidly obese   HENT:      Head: Normocephalic and atraumatic.      Nose: Nose normal.      Mouth/Throat:      Mouth: Mucous membranes are moist.      Pharynx: Oropharynx is clear. No oropharyngeal exudate or posterior oropharyngeal erythema.   Eyes:      General: No scleral icterus.     Extraocular Movements:  Extraocular movements intact.      Conjunctiva/sclera: Conjunctivae normal.      Pupils: Pupils are equal, round, and reactive to light.   Neck:      Vascular: No carotid bruit.   Cardiovascular:      Rate and Rhythm: Normal rate and regular rhythm.      Pulses: Normal pulses.      Heart sounds: Normal heart sounds. No murmur heard.    No gallop.   Pulmonary:      Effort: Pulmonary effort is normal.      Breath sounds: Normal breath sounds. No wheezing.   Abdominal:      General: Abdomen is flat. Bowel sounds are normal.      Palpations: Abdomen is soft.      Tenderness: There is no abdominal tenderness.   Musculoskeletal:         General: Normal range of motion.      Cervical back: Normal range of motion and neck supple.      Right lower leg: Edema present.      Left lower leg: Edema present.   Lymphadenopathy:      Cervical: No cervical adenopathy.   Skin:     General: Skin is warm and dry.      Capillary Refill: Capillary refill takes less than 2 seconds.      Coloration: Skin is not jaundiced.      Findings: No lesion or rash.   Neurological:      General: No focal deficit present.      Mental Status: She is alert and oriented to person, place, and time. Mental status is at baseline.      Cranial Nerves: No cranial nerve deficit.      Sensory: No sensory deficit.      Motor: No weakness.      Coordination: Coordination normal.      Gait: Gait normal.      Deep Tendon Reflexes: Reflexes normal.   Psychiatric:         Mood and Affect: Mood normal.         Behavior: Behavior normal.         Thought Content: Thought content normal.         Judgment: Judgment normal.               Lab Results   Component Value Date    WBC 6.30 10/06/2021    HGB 13.6 10/06/2021    HCT 40.5 10/06/2021    MCV 87.5 10/06/2021     10/06/2021          Sodium   Date Value Ref Range Status   03/24/2022 138 136 - 145 mmol/L Final     Potassium   Date Value Ref Range Status   03/24/2022 3.5 3.5 - 5.1 mmol/L Final     Chloride   Date  Value Ref Range Status   03/24/2022 103 98 - 107 mmol/L Final     CO2   Date Value Ref Range Status   03/24/2022 29 21 - 32 mmol/L Final     Glucose   Date Value Ref Range Status   03/24/2022 87 74 - 106 mg/dL Final     BUN   Date Value Ref Range Status   03/24/2022 12 7 - 18 mg/dL Final     Creatinine   Date Value Ref Range Status   03/24/2022 0.96 0.55 - 1.02 mg/dL Final     Calcium   Date Value Ref Range Status   03/24/2022 9.3 8.5 - 10.1 mg/dL Final     Total Protein   Date Value Ref Range Status   10/06/2021 7.8 6.4 - 8.2 g/dL Final     Albumin   Date Value Ref Range Status   10/06/2021 3.4 (L) 3.5 - 5.0 g/dL Final     Bilirubin, Total   Date Value Ref Range Status   10/06/2021 0.4 >0.0 - 1.2 mg/dL Final     Alk Phos   Date Value Ref Range Status   10/06/2021 83 50 - 130 U/L Final     AST   Date Value Ref Range Status   10/06/2021 16 15 - 37 U/L Final     ALT   Date Value Ref Range Status   10/06/2021 17 13 - 56 U/L Final     Anion Gap   Date Value Ref Range Status   03/24/2022 10 7 - 16 mmol/L Final     eGFR    Date Value Ref Range Status   10/06/2021 73 >=60 mL/min/1.73m² Final     eGFR   Date Value Ref Range Status   03/24/2022 62 >=60 mL/min/1.73m² Final      X-Ray Ankle Complete 3 View Left  Narrative: EXAMINATION:  XR ANKLE COMPLETE 3 VIEW LEFT    CLINICAL HISTORY:  Pain in unspecified ankle and joints of unspecified foot    TECHNIQUE:  AP, lateral and oblique views of the left ankle were performed.    COMPARISON:  None    FINDINGS:  There is prominent soft tissue swelling.  There are moderately severe degenerative changes at the ankle mortise and a large spur at the plantar aspect of the calcaneus.  No fracture or dislocation.  Impression: Soft tissue swelling and moderately severe osteoarthritis.    Electronically signed by: Bridgette Atkinson  Date:    08/05/2021  Time:    12:22     Procedures   Assessment and Plan (including Health Maintenance)      Problem List  Smart Sets  Document Outside  HM   :    Plan:         Health Maintenance Due   Topic Date Due    Hepatitis C Screening  Never done    HIV Screening  Never done    TETANUS VACCINE  Never done    DEXA Scan  Never done    Shingles Vaccine (1 of 2) Never done    Colorectal Cancer Screening  10/18/2021    Pneumococcal Vaccines (Age 65+) (1 of 1 - PPSV23) 01/19/2022       Problem List Items Addressed This Visit        Cardiac/Vascular    Essential hypertension    Current Assessment & Plan     Blood pressure is well controlled.  No change in medication.  Follow-up every              Renal/    Hypokalemia    Current Assessment & Plan     History of hypokalemia in the past and will check a magnesium and BMP on her           Relevant Medications    magnesium chloride (SLOW-MAG) 71.5 mg TbEC    Other Relevant Orders    Basic Metabolic Panel (Completed)    Magnesium (Completed)    Urge incontinence of urine    Current Assessment & Plan     Patient has are urgent continence will refer to Urology for further evaluation.           Relevant Orders    Ambulatory referral/consult to Urology       Oncology    Other dietary vitamin B12 deficiency anemia - Primary    Current Assessment & Plan     Patient states she has history of B12 deficiency with anemia.  Give B12 shot  today and check a B12/ folate and CBC           Relevant Medications    cyanocobalamin injection 1,000 mcg (Completed)    Other Relevant Orders    Vitamin B12 & Folate (Completed)       Endocrine    Obesity    Current Assessment & Plan     Patient has been on proximally 4 months of Adipex and has not lost any weight.  Patient states that she will lose weight in the gain it back.  She is also on medications for chronic pain to include gabapentin and hydrocodone.  Because of the multiple schedule drugs and because she is not losing weight we have discontinued the Adipex toes you have to wait for least 3-4 months.  Advised her to possibly have bariatric surgery.  She stated she did not want  bariatric surgery.           Hypothyroidism    Current Assessment & Plan     Patient has history of hypothyroidism will check a TSH and T4 level on her today.  No change           Relevant Medications    levothyroxine (SYNTHROID) 125 MCG tablet      Other Visit Diagnoses     Hyperglycemia        Relevant Orders    Basic Metabolic Panel (Completed)    Hemoglobin A1C (Completed)          Health Maintenance Topics with due status: Not Due       Topic Last Completion Date    Lipid Panel 10/06/2021    Mammogram 11/16/2021       Future Appointments   Date Time Provider Department Center   4/7/2022  2:00 PM Mayra Vargas MD [facility] DERM [place]   5/12/2022  1:00 PM IVETH Lennon [facility] UROL [facility]   6/22/2022  9:00 AM Meek Benitez DO [facility] PH[facility] [place]        Follow up in about 3 months (around 6/24/2022).     Signature:  [facility]  [address] [place] [address], [place]  [address]    Date of encounter: 3/24/22

## 2022-03-25 PROBLEM — E87.6 HYPOKALEMIA: Status: ACTIVE | Noted: 2022-03-25

## 2022-03-25 PROBLEM — N39.41 URGE INCONTINENCE OF URINE: Status: ACTIVE | Noted: 2022-03-25

## 2022-03-25 PROBLEM — D51.3 OTHER DIETARY VITAMIN B12 DEFICIENCY ANEMIA: Status: ACTIVE | Noted: 2022-03-25

## 2022-03-25 NOTE — ASSESSMENT & PLAN NOTE
Patient has been on proximally 4 months of Adipex and has not lost any weight.  Patient states that she will lose weight in the gain it back.  She is also on medications for chronic pain to include gabapentin and hydrocodone.  Because of the multiple schedule drugs and because she is not losing weight we have discontinued the Adipex toes you have to wait for least 3-4 months.  Advised her to possibly have bariatric surgery.  She stated she did not want bariatric surgery.

## 2022-03-25 NOTE — ASSESSMENT & PLAN NOTE
Patient states she has history of B12 deficiency with anemia.  Give B12 shot  today and check a B12/ folate and CBC

## 2022-05-24 ENCOUNTER — OFFICE VISIT (OUTPATIENT)
Dept: DERMATOLOGY | Facility: CLINIC | Age: 67
End: 2022-05-24
Payer: COMMERCIAL

## 2022-05-24 VITALS — WEIGHT: 287 LBS | BODY MASS INDEX: 46.12 KG/M2 | HEIGHT: 66 IN

## 2022-05-24 DIAGNOSIS — L91.0 KELOID SCAR: Primary | ICD-10-CM

## 2022-05-24 PROCEDURE — 3044F HG A1C LEVEL LT 7.0%: CPT | Mod: CPTII,,, | Performed by: STUDENT IN AN ORGANIZED HEALTH CARE EDUCATION/TRAINING PROGRAM

## 2022-05-24 PROCEDURE — 3008F BODY MASS INDEX DOCD: CPT | Mod: CPTII,,, | Performed by: STUDENT IN AN ORGANIZED HEALTH CARE EDUCATION/TRAINING PROGRAM

## 2022-05-24 PROCEDURE — 11900 INJECT SKIN LESIONS </W 7: CPT | Mod: ,,, | Performed by: STUDENT IN AN ORGANIZED HEALTH CARE EDUCATION/TRAINING PROGRAM

## 2022-05-24 PROCEDURE — 1159F PR MEDICATION LIST DOCUMENTED IN MEDICAL RECORD: ICD-10-PCS | Mod: CPTII,,, | Performed by: STUDENT IN AN ORGANIZED HEALTH CARE EDUCATION/TRAINING PROGRAM

## 2022-05-24 PROCEDURE — 99499 NO LOS: ICD-10-PCS | Mod: ,,, | Performed by: STUDENT IN AN ORGANIZED HEALTH CARE EDUCATION/TRAINING PROGRAM

## 2022-05-24 PROCEDURE — 1160F RVW MEDS BY RX/DR IN RCRD: CPT | Mod: CPTII,,, | Performed by: STUDENT IN AN ORGANIZED HEALTH CARE EDUCATION/TRAINING PROGRAM

## 2022-05-24 PROCEDURE — 1159F MED LIST DOCD IN RCRD: CPT | Mod: CPTII,,, | Performed by: STUDENT IN AN ORGANIZED HEALTH CARE EDUCATION/TRAINING PROGRAM

## 2022-05-24 PROCEDURE — 11900 PR INJECTION INTO SKIN LESIONS, UP TO 7: ICD-10-PCS | Mod: ,,, | Performed by: STUDENT IN AN ORGANIZED HEALTH CARE EDUCATION/TRAINING PROGRAM

## 2022-05-24 PROCEDURE — 3044F PR MOST RECENT HEMOGLOBIN A1C LEVEL <7.0%: ICD-10-PCS | Mod: CPTII,,, | Performed by: STUDENT IN AN ORGANIZED HEALTH CARE EDUCATION/TRAINING PROGRAM

## 2022-05-24 PROCEDURE — 1160F PR REVIEW ALL MEDS BY PRESCRIBER/CLIN PHARMACIST DOCUMENTED: ICD-10-PCS | Mod: CPTII,,, | Performed by: STUDENT IN AN ORGANIZED HEALTH CARE EDUCATION/TRAINING PROGRAM

## 2022-05-24 PROCEDURE — 99499 UNLISTED E&M SERVICE: CPT | Mod: ,,, | Performed by: STUDENT IN AN ORGANIZED HEALTH CARE EDUCATION/TRAINING PROGRAM

## 2022-05-24 PROCEDURE — 3008F PR BODY MASS INDEX (BMI) DOCUMENTED: ICD-10-PCS | Mod: CPTII,,, | Performed by: STUDENT IN AN ORGANIZED HEALTH CARE EDUCATION/TRAINING PROGRAM

## 2022-05-24 RX ORDER — LIDOCAINE AND PRILOCAINE 25; 25 MG/G; MG/G
CREAM TOPICAL
Qty: 30 G | Refills: 0 | Status: SHIPPED | OUTPATIENT
Start: 2022-05-24 | End: 2024-01-08

## 2022-05-24 NOTE — PROGRESS NOTES
[facility]  Marc Vargas MD    [address] [place] [address], [place] [address]  [phone]    Fax: [phone]    Patient Name: Jeanie Diamond  Medical Record Number: [id number]  PCP: Meek Benitez DO  Age: 67 y.o. : 1955  Contact: [phone] (home)     History of Present Illness:     Jeanie Diamond is a 67 y.o.  female here for follow up of keloid of the L knee following total knee replacement. Previous treatment includes ILK and clobetasol ointment. This has greatly improved the pain but she has some hypopigmentation from the injection.     The patient has no other concerns today.    Review of Systems:     Unremarkable other than mentioned above.     Physical Exam:     General: Relaxed, oriented, alert    Skin examination of the scalp, face, neck, chest, back, abdomen, upper extremities and lower extremities were normal except for as listed below      Assessment and Plan:     1. 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     Plan: Intralesional Kenalog    Lesions Injected: 3  Medication Injected: 10 mg/cc of Kenalog  Total Volume Injected: 1 cc  NDC #: 5373-2423-91  Lot: [id number]  Expiration: 2023  Administered by: Marc Vargas MD     The risks of atrophy were reviewed with the patient.      Return to clinic in 8 weeks.    AVS printed with patient instructions     Marc Vargas MD   Mohs Surgery/Dermatologic Oncology  Dermatology

## 2022-06-22 ENCOUNTER — OFFICE VISIT (OUTPATIENT)
Dept: FAMILY MEDICINE | Facility: CLINIC | Age: 67
End: 2022-06-22
Payer: COMMERCIAL

## 2022-06-22 VITALS
WEIGHT: 279.38 LBS | RESPIRATION RATE: 18 BRPM | HEART RATE: 60 BPM | SYSTOLIC BLOOD PRESSURE: 104 MMHG | BODY MASS INDEX: 44.9 KG/M2 | HEIGHT: 66 IN | OXYGEN SATURATION: 98 % | DIASTOLIC BLOOD PRESSURE: 74 MMHG | TEMPERATURE: 98 F

## 2022-06-22 DIAGNOSIS — M54.9 BACK PAIN, UNSPECIFIED BACK LOCATION, UNSPECIFIED BACK PAIN LATERALITY, UNSPECIFIED CHRONICITY: ICD-10-CM

## 2022-06-22 DIAGNOSIS — E03.9 HYPOTHYROIDISM, UNSPECIFIED TYPE: ICD-10-CM

## 2022-06-22 DIAGNOSIS — L91.0 KELOID CICATRIX: ICD-10-CM

## 2022-06-22 DIAGNOSIS — E66.09 OBESITY DUE TO EXCESS CALORIES WITH SERIOUS COMORBIDITY, UNSPECIFIED CLASSIFICATION: Primary | ICD-10-CM

## 2022-06-22 DIAGNOSIS — E66.9 OBESITY, UNSPECIFIED CLASSIFICATION, UNSPECIFIED OBESITY TYPE, UNSPECIFIED WHETHER SERIOUS COMORBIDITY PRESENT: ICD-10-CM

## 2022-06-22 DIAGNOSIS — J32.9 SINUSITIS, UNSPECIFIED CHRONICITY, UNSPECIFIED LOCATION: ICD-10-CM

## 2022-06-22 DIAGNOSIS — I10 ESSENTIAL HYPERTENSION: ICD-10-CM

## 2022-06-22 DIAGNOSIS — M25.569 KNEE PAIN, UNSPECIFIED CHRONICITY, UNSPECIFIED LATERALITY: ICD-10-CM

## 2022-06-22 DIAGNOSIS — F32.A DEPRESSIVE DISORDER: ICD-10-CM

## 2022-06-22 LAB
ANION GAP SERPL CALCULATED.3IONS-SCNC: 7 MMOL/L (ref 7–16)
BUN SERPL-MCNC: 9 MG/DL (ref 7–18)
BUN/CREAT SERPL: 9 (ref 6–20)
CALCIUM SERPL-MCNC: 9.3 MG/DL (ref 8.5–10.1)
CHLORIDE SERPL-SCNC: 105 MMOL/L (ref 98–107)
CHOLEST SERPL-MCNC: 179 MG/DL (ref 0–200)
CHOLEST/HDLC SERPL: 3.6 {RATIO}
CO2 SERPL-SCNC: 32 MMOL/L (ref 21–32)
CREAT SERPL-MCNC: 1 MG/DL (ref 0.55–1.02)
EST. AVERAGE GLUCOSE BLD GHB EST-MCNC: 104 MG/DL
GLUCOSE SERPL-MCNC: 55 MG/DL (ref 74–106)
HBA1C MFR BLD HPLC: 5.7 % (ref 4.5–6.6)
HDLC SERPL-MCNC: 50 MG/DL (ref 40–60)
LDLC SERPL CALC-MCNC: 88 MG/DL
LDLC/HDLC SERPL: 1.8 {RATIO}
NONHDLC SERPL-MCNC: 129 MG/DL
POTASSIUM SERPL-SCNC: 3.4 MMOL/L (ref 3.5–5.1)
SODIUM SERPL-SCNC: 141 MMOL/L (ref 136–145)
TRIGL SERPL-MCNC: 204 MG/DL (ref 35–150)
VLDLC SERPL-MCNC: 41 MG/DL

## 2022-06-22 PROCEDURE — 3044F HG A1C LEVEL LT 7.0%: CPT | Mod: ,,, | Performed by: FAMILY MEDICINE

## 2022-06-22 PROCEDURE — 83036 HEMOGLOBIN GLYCOSYLATED A1C: CPT | Mod: GZ,,, | Performed by: CLINICAL MEDICAL LABORATORY

## 2022-06-22 PROCEDURE — 99214 PR OFFICE/OUTPT VISIT, EST, LEVL IV, 30-39 MIN: ICD-10-PCS | Mod: ,,, | Performed by: FAMILY MEDICINE

## 2022-06-22 PROCEDURE — 3074F PR MOST RECENT SYSTOLIC BLOOD PRESSURE < 130 MM HG: ICD-10-PCS | Mod: ,,, | Performed by: FAMILY MEDICINE

## 2022-06-22 PROCEDURE — 3078F PR MOST RECENT DIASTOLIC BLOOD PRESSURE < 80 MM HG: ICD-10-PCS | Mod: ,,, | Performed by: FAMILY MEDICINE

## 2022-06-22 PROCEDURE — 3008F BODY MASS INDEX DOCD: CPT | Mod: ,,, | Performed by: FAMILY MEDICINE

## 2022-06-22 PROCEDURE — 99214 OFFICE O/P EST MOD 30 MIN: CPT | Mod: ,,, | Performed by: FAMILY MEDICINE

## 2022-06-22 PROCEDURE — 80048 BASIC METABOLIC PANEL: ICD-10-PCS | Mod: ,,, | Performed by: CLINICAL MEDICAL LABORATORY

## 2022-06-22 PROCEDURE — 80048 BASIC METABOLIC PNL TOTAL CA: CPT | Mod: ,,, | Performed by: CLINICAL MEDICAL LABORATORY

## 2022-06-22 PROCEDURE — 3074F SYST BP LT 130 MM HG: CPT | Mod: ,,, | Performed by: FAMILY MEDICINE

## 2022-06-22 PROCEDURE — 3078F DIAST BP <80 MM HG: CPT | Mod: ,,, | Performed by: FAMILY MEDICINE

## 2022-06-22 PROCEDURE — 80061 LIPID PANEL: ICD-10-PCS | Mod: ,,, | Performed by: CLINICAL MEDICAL LABORATORY

## 2022-06-22 PROCEDURE — 83036 HEMOGLOBIN A1C: ICD-10-PCS | Mod: GZ,,, | Performed by: CLINICAL MEDICAL LABORATORY

## 2022-06-22 PROCEDURE — 3044F PR MOST RECENT HEMOGLOBIN A1C LEVEL <7.0%: ICD-10-PCS | Mod: ,,, | Performed by: FAMILY MEDICINE

## 2022-06-22 PROCEDURE — 3008F PR BODY MASS INDEX (BMI) DOCUMENTED: ICD-10-PCS | Mod: ,,, | Performed by: FAMILY MEDICINE

## 2022-06-22 PROCEDURE — 80061 LIPID PANEL: CPT | Mod: ,,, | Performed by: CLINICAL MEDICAL LABORATORY

## 2022-06-22 RX ORDER — ATENOLOL AND CHLORTHALIDONE TABLET 50; 25 MG/1; MG/1
1 TABLET ORAL DAILY
Qty: 90 TABLET | Refills: 1 | Status: SHIPPED | OUTPATIENT
Start: 2022-06-22 | End: 2023-03-31 | Stop reason: SDUPTHER

## 2022-06-22 RX ORDER — PHENTERMINE HYDROCHLORIDE 37.5 MG/1
37.5 TABLET ORAL
Qty: 30 TABLET | Refills: 0 | Status: SHIPPED | OUTPATIENT
Start: 2022-06-22 | End: 2023-09-06

## 2022-06-22 RX ORDER — GABAPENTIN 100 MG/1
100 CAPSULE ORAL 3 TIMES DAILY
Qty: 90 CAPSULE | Refills: 5 | Status: SHIPPED | OUTPATIENT
Start: 2022-06-22 | End: 2023-03-31 | Stop reason: SDUPTHER

## 2022-06-22 RX ORDER — CETIRIZINE HYDROCHLORIDE 10 MG/1
10 TABLET ORAL DAILY
Qty: 90 TABLET | Refills: 3 | Status: SHIPPED | OUTPATIENT
Start: 2022-06-22 | End: 2024-01-08 | Stop reason: SDUPTHER

## 2022-06-22 RX ORDER — DICLOFENAC SODIUM 10 MG/G
GEL TOPICAL
Qty: 100 G | Refills: 5 | Status: SHIPPED | OUTPATIENT
Start: 2022-06-22 | End: 2023-11-22

## 2022-06-22 RX ORDER — CLOBETASOL PROPIONATE 0.5 MG/G
OINTMENT TOPICAL 2 TIMES DAILY
Qty: 60 G | Refills: 5 | Status: SHIPPED | OUTPATIENT
Start: 2022-06-22

## 2022-06-22 RX ORDER — SERTRALINE HYDROCHLORIDE 50 MG/1
50 TABLET, FILM COATED ORAL DAILY
Qty: 30 TABLET | Refills: 5 | Status: SHIPPED | OUTPATIENT
Start: 2022-06-22 | End: 2023-03-31 | Stop reason: SDUPTHER

## 2022-06-22 RX ORDER — IPRATROPIUM BROMIDE 42 UG/1
2 SPRAY, METERED NASAL 4 TIMES DAILY
Qty: 15 ML | Refills: 5 | Status: SHIPPED | OUTPATIENT
Start: 2022-06-22 | End: 2023-09-06

## 2022-06-22 RX ORDER — LEVOTHYROXINE SODIUM 125 UG/1
125 TABLET ORAL DAILY
Qty: 90 TABLET | Refills: 1 | Status: SHIPPED | OUTPATIENT
Start: 2022-06-22 | End: 2023-09-06

## 2022-06-22 NOTE — PROGRESS NOTES
Pt states needs help to try and figure out how to work ozempic  pen. Pt states also needing one.

## 2022-07-05 NOTE — PROGRESS NOTES
Her will   Meek Benitez DO   [address]  [place]  [address]      PATIENT NAME: Jeanie Diamond  : 1955  DATE: 22  MRN: [id number]      Billing Provider: Meek Benitez DO  Level of Service:   Patient PCP Information     Provider PCP Type    Meek Benitez DO General          Reason for Visit / Chief Complaint: Depression (3 month follow and medication refills. ), Hypertension (3 month follow and medication refills), Insomnia (PT states unable to sleep. Pt states hasn't slept in 3 days. ), Obesity (Pt would like to discuss starting back adipex. ), Hypothyroidism (3 month check up and medication refills. ), and Sinusitis (3 month check up and medication refills )       Update PCP  Update Chief Complaint         History of Present Illness / Problem Focused Workflow     Jeanie Diamond presents to the clinic with Depression (3 month follow and medication refills. ), Hypertension (3 month follow and medication refills), Insomnia (PT states unable to sleep. Pt states hasn't slept in 3 days. ), Obesity (Pt would like to discuss starting back adipex. ), Hypothyroidism (3 month check up and medication refills. ), and Sinusitis (3 month check up and medication refills )     Patient is in today for multiple problems.  Primarily she came in because she wants to try to lose some weight.  She previously had been on Adipex but had stopped losing weight so we discontinue the medicines for at least 3 months.  She does have difficulty with ambulation because of her low back as well as her knees.  Chest history of hypo thyroid hypertension and hyperlipidemia.  She right so has history of depression which is stable and some sinus issues but Atrovent controls the symptoms.    DepressionPatient presents with the following symptoms: shortness of breath.  Patient is not experiencing: confusion, decreased concentration, nervousness/anxiety, palpitations and suicidal  ideas.    Hypertension  Associated symptoms include shortness of breath. Pertinent negatives include no chest pain, headaches, neck pain or palpitations.   Sinusitis  Associated symptoms include congestion and shortness of breath. Pertinent negatives include no chills, coughing, ear pain, headaches, neck pain, sinus pressure or sore throat.       Review of Systems     Review of Systems   Constitutional: Positive for activity change. Negative for appetite change, chills, fatigue and fever.   HENT: Positive for nasal congestion. Negative for ear discharge, ear pain, mouth dryness, mouth sores, postnasal drip, sinus pressure/congestion, sore throat and voice change.    Eyes: Negative for pain, discharge, redness, itching and visual disturbance.   Respiratory: Positive for shortness of breath. Negative for apnea, cough, chest tightness and wheezing.    Cardiovascular: Negative for chest pain, palpitations and leg swelling.   Gastrointestinal: Negative for abdominal distention, abdominal pain, anal bleeding, blood in stool, change in bowel habit, constipation, diarrhea, nausea, vomiting, reflux and change in bowel habit.   Endocrine: Negative for cold intolerance, heat intolerance, polydipsia, polyphagia and polyuria.   Genitourinary: Negative for difficulty urinating, enuresis, frequency, genital sores, hematuria, hot flashes, menstrual irregularity, urgency and vaginal dryness.   Musculoskeletal: Positive for arthralgias, joint swelling, leg pain and joint deformity. Negative for back pain, gait problem, myalgias and neck pain.   Integumentary:  Negative for rash, mole/lesion, breast mass, breast discharge and breast tenderness.   Allergic/Immunologic: Negative for environmental allergies and food allergies.   Neurological: Negative for dizziness, vertigo, tremors, seizures, syncope, facial asymmetry, speech difficulty, weakness, light-headedness, numbness, headaches, disturbances in coordination, memory loss and  coordination difficulties.   Hematological: Negative for adenopathy. Does not bruise/bleed easily.   Psychiatric/Behavioral: Positive for depression. Negative for agitation, behavioral problems, confusion, decreased concentration, dysphoric mood, hallucinations, self-injury, sleep disturbance and suicidal ideas. The patient is not nervous/anxious and is not hyperactive.    Breast: Negative for mass and tenderness      Medical / Social / Family History     Past Medical History:   Diagnosis Date    Depressive disorder     Essential hypertension     Hypothyroidism     Low back pain     Obesity     Other chronic pain     Pain in left knee     Personal history of colonic polyps     Unilateral primary osteoarthritis, left knee        Past Surgical History:   Procedure Laterality Date    BILATERAL OOPHORECTOMY      COLONOSCOPY  04/28/2015    HYSTERECTOMY  1996    ROTATOR CUFF REPAIR Right     x 2    TOTAL KNEE ARTHROPLASTY Left 07/2021    Dr. Irving Benitez       Social History  Ms.  reports that she quit smoking about 14 months ago. Her smoking use included cigarettes. She started smoking about 35 years ago. She has never used smokeless tobacco. She reports current alcohol use. She reports that she does not use drugs.    Family History  Ms.'s family history includes Diabetes in her sister.    Medications and Allergies     Medications  Outpatient Medications Marked as Taking for the 6/22/22 encounter (Office Visit) with Meek Benitez, DO   Medication Sig Dispense Refill    HYDROcodone-acetaminophen (NORCO)  mg per tablet Pt has cut back to 2 daily until after surgery      ibuprofen (ADVIL,MOTRIN) 800 MG tablet Take 1 tablet (800 mg total) by mouth 3 (three) times daily. 90 tablet 5    LIDOcaine-prilocaine (EMLA) cream Apply topically as needed (for pain). 30 g 0    meclizine (ANTIVERT) 25 mg tablet TAKE ONE TABLET BY MOUTH THREE TIMES DAILY 270 tablet 1    potassium chloride (KLOR-CON) 10 MEQ  TbSR Take 1 tablet (10 mEq total) by mouth once daily. 90 tablet 1    semaglutide (OZEMPIC) 0.25 mg or 0.5 mg(2 mg/1.5 mL) pen injector Inject 0.5 mg into the skin every 7 days. 3 pen 1    tiZANidine (ZANAFLEX) 4 MG tablet Take 1 tablet (4 mg total) by mouth 3 (three) times daily. 90 tablet 5    TRULICITY 1.5 mg/0.5 mL pen injector INJECT SUBCUTANEOUSLY 1.5 MG (0.5 milliliters) ONCE A WEEK      [DISCONTINUED] atenoloL-chlorthalidone (TENORETIC) 50-25 mg Tab Take 1 tablet by mouth once daily. 90 tablet 1    [DISCONTINUED] cetirizine (ZYRTEC) 10 MG tablet Take 1 tablet (10 mg total) by mouth once daily. 90 tablet 3    [DISCONTINUED] clobetasol 0.05% (TEMOVATE) 0.05 % Oint Apply topically 2 (two) times daily. 60 g 5    [DISCONTINUED] diclofenac sodium (VOLTAREN) 1 % Gel APPLY TO THE AFFECTED AREA(S) FOUR TIMES DAILY FOR 30 DAYS 100 g 5    [DISCONTINUED] gabapentin (NEURONTIN) 100 MG capsule Take 1 capsule (100 mg total) by mouth 3 (three) times daily. 90 capsule 5    [DISCONTINUED] ipratropium (ATROVENT) 42 mcg (0.06 %) nasal spray 2 sprays by Nasal route 4 (four) times daily. 15 mL 5    [DISCONTINUED] levothyroxine (SYNTHROID) 125 MCG tablet Take 1 tablet (125 mcg total) by mouth once daily. 90 tablet 1    [DISCONTINUED] sertraline (ZOLOFT) 50 MG tablet Take 1 tablet (50 mg total) by mouth once daily. 30 tablet 5       Allergies  Review of patient's allergies indicates:   Allergen Reactions    Macrolide antibiotics        Physical Examination     Vitals:    06/22/22 0925   BP: 104/74   Pulse: 60   Resp: 18   Temp: 97.7 °F (36.5 °C)     Physical Exam  Vitals reviewed.   Constitutional:       Appearance: Normal appearance. She is obese.   HENT:      Head: Normocephalic and atraumatic.      Nose: Congestion present. No rhinorrhea.      Mouth/Throat:      Mouth: Mucous membranes are moist.      Pharynx: Oropharynx is clear. No oropharyngeal exudate or posterior oropharyngeal erythema.   Eyes:      General: No  scleral icterus.     Extraocular Movements: Extraocular movements intact.      Conjunctiva/sclera: Conjunctivae normal.      Pupils: Pupils are equal, round, and reactive to light.   Neck:      Vascular: No carotid bruit.   Cardiovascular:      Rate and Rhythm: Normal rate and regular rhythm.      Pulses: Normal pulses.      Heart sounds: Normal heart sounds. No murmur heard.    No gallop.   Pulmonary:      Effort: Pulmonary effort is normal.      Breath sounds: Normal breath sounds. No wheezing.   Abdominal:      General: Abdomen is flat. Bowel sounds are normal.      Palpations: Abdomen is soft.      Tenderness: There is no abdominal tenderness.   Musculoskeletal:         General: Tenderness present. Normal range of motion.      Cervical back: Normal range of motion and neck supple.      Right lower leg: Edema present.      Left lower leg: Edema present.   Lymphadenopathy:      Cervical: No cervical adenopathy.   Skin:     General: Skin is warm and dry.      Capillary Refill: Capillary refill takes less than 2 seconds.      Coloration: Skin is not jaundiced.      Findings: No lesion.   Neurological:      General: No focal deficit present.      Mental Status: She is alert and oriented to person, place, and time. Mental status is at baseline.      Cranial Nerves: No cranial nerve deficit.      Sensory: No sensory deficit.      Motor: No weakness.      Coordination: Coordination normal.      Gait: Gait normal.      Deep Tendon Reflexes: Reflexes normal.   Psychiatric:         Mood and Affect: Mood normal.         Behavior: Behavior normal.         Thought Content: Thought content normal.         Judgment: Judgment normal.               Lab Results   Component Value Date    WBC 6.30 10/06/2021    HGB 13.6 10/06/2021    HCT 40.5 10/06/2021    MCV 87.5 10/06/2021     10/06/2021          Sodium   Date Value Ref Range Status   06/22/2022 141 136 - 145 mmol/L Final     Potassium   Date Value Ref Range Status    06/22/2022 3.4 (L) 3.5 - 5.1 mmol/L Final     Chloride   Date Value Ref Range Status   06/22/2022 105 98 - 107 mmol/L Final     CO2   Date Value Ref Range Status   06/22/2022 32 21 - 32 mmol/L Final     Glucose   Date Value Ref Range Status   06/22/2022 55 (L) 74 - 106 mg/dL Final     BUN   Date Value Ref Range Status   06/22/2022 9 7 - 18 mg/dL Final     Creatinine   Date Value Ref Range Status   06/22/2022 1.00 0.55 - 1.02 mg/dL Final     Calcium   Date Value Ref Range Status   06/22/2022 9.3 8.5 - 10.1 mg/dL Final     Total Protein   Date Value Ref Range Status   10/06/2021 7.8 6.4 - 8.2 g/dL Final     Albumin   Date Value Ref Range Status   10/06/2021 3.4 (L) 3.5 - 5.0 g/dL Final     Bilirubin, Total   Date Value Ref Range Status   10/06/2021 0.4 >0.0 - 1.2 mg/dL Final     Alk Phos   Date Value Ref Range Status   10/06/2021 83 50 - 130 U/L Final     AST   Date Value Ref Range Status   10/06/2021 16 15 - 37 U/L Final     ALT   Date Value Ref Range Status   10/06/2021 17 13 - 56 U/L Final     Anion Gap   Date Value Ref Range Status   06/22/2022 7 7 - 16 mmol/L Final     eGFR    Date Value Ref Range Status   10/06/2021 73 >=60 mL/min/1.73m² Final     eGFR   Date Value Ref Range Status   06/22/2022 59 (L) >=60 mL/min/1.73m² Final      X-Ray Ankle Complete 3 View Left  Narrative: EXAMINATION:  XR ANKLE COMPLETE 3 VIEW LEFT    CLINICAL HISTORY:  Pain in unspecified ankle and joints of unspecified foot    TECHNIQUE:  AP, lateral and oblique views of the left ankle were performed.    COMPARISON:  None    FINDINGS:  There is prominent soft tissue swelling.  There are moderately severe degenerative changes at the ankle mortise and a large spur at the plantar aspect of the calcaneus.  No fracture or dislocation.  Impression: Soft tissue swelling and moderately severe osteoarthritis.    Electronically signed by: Bridgette Atkinson  Date:    08/05/2021  Time:    12:22     Procedures   Assessment and Plan  (including Health Maintenance)      Problem List  Smart Sets  Document Outside HM   :    Plan:         Health Maintenance Due   Topic Date Due    Hepatitis C Screening  Never done    HIV Screening  Never done    TETANUS VACCINE  Never done    DEXA Scan  Never done    Shingles Vaccine (1 of 2) Never done    Colorectal Cancer Screening  10/18/2021    Pneumococcal Vaccines (Age 65+) (1 - PCV) 01/19/2022    COVID-19 Vaccine (4 - Booster for Moderna series) 05/17/2022       Problem List Items Addressed This Visit        Psychiatric    Depressive disorder    Current Assessment & Plan     Depression currently stable no change in medicines.  Follow-up in 3 months.           Relevant Orders    Ambulatory referral/consult to Psychiatry       ENT    Sinusitis    Current Assessment & Plan     Refill the patient's Atrovent nasal spray.  Follow-up on a p.r.n. basis.           Relevant Medications    cetirizine (ZYRTEC) 10 MG tablet    ipratropium (ATROVENT) 42 mcg (0.06 %) nasal spray       Derm    Keloid cicatrix    Relevant Medications    clobetasol 0.05% (TEMOVATE) 0.05 % Oint       Cardiac/Vascular    Essential hypertension    Current Assessment & Plan     Blood pressure well controlled.  No change in medicine.  Follow-up 3 months           Relevant Medications    atenoloL-chlorthalidone (TENORETIC) 50-25 mg Tab       Endocrine    Obesity - Primary    Current Assessment & Plan     Patient has been off phentermine for 3 months.  We will again try to  lose weight by using Adipex daily.  Discussed weight loss as well as diet her.  Patient is having difficulty exercising due to back pain and knee pain.  Advised her that she will have drug screenings as well as will need to lose weight continue the medication.           Relevant Medications    phentermine (ADIPEX-P) 37.5 mg tablet    Other Relevant Orders    Basic Metabolic Panel (Completed)    Hemoglobin A1C (Completed)    Lipid Panel (Completed)    Hypothyroidism     Current Assessment & Plan     Last TSH level was 0.378 in October of 2021. No change in treatment.  Patient is clinically euthyroid.  Recheck level again in October 2022.            Relevant Medications    levothyroxine (SYNTHROID) 125 MCG tablet       Orthopedic    Back pain    Current Assessment & Plan     Patient currently sees a pain management.  Will not change her medicines.  She is to follow-up with pain management on a monthly basis.           Relevant Medications    gabapentin (NEURONTIN) 100 MG capsule    sertraline (ZOLOFT) 50 MG tablet      Other Visit Diagnoses     Knee pain, unspecified chronicity, unspecified laterality        Relevant Medications    diclofenac sodium (VOLTAREN) 1 % Gel          Health Maintenance Topics with due status: Not Due       Topic Last Completion Date    Influenza Vaccine 10/06/2021    Mammogram 11/16/2021    Lipid Panel 06/22/2022       Future Appointments   Date Time Provider Department Center   7/19/2022 10:45 AM Mayra Vargas MD [facility]   7/27/2022 10:00 AM Meek Benitez DO [facility]   12/22/2022  8:00 AM AWV NURSE DECDHARA [facility]        Follow up in about 3 months (around 9/22/2022).     Signature:  DO [facility]  [id number] [address] [place]  [address]    Date of encounter: 6/22/22

## 2022-07-05 NOTE — ASSESSMENT & PLAN NOTE
Last TSH level was 0.378 in October of 2021. No change in treatment.  Patient is clinically euthyroid.  Recheck level again in October 2022.

## 2022-07-05 NOTE — ASSESSMENT & PLAN NOTE
Patient has been off phentermine for 3 months.  We will again try to  lose weight by using Adipex daily.  Discussed weight loss as well as diet her.  Patient is having difficulty exercising due to back pain and knee pain.  Advised her that she will have drug screenings as well as will need to lose weight continue the medication.

## 2022-07-05 NOTE — ASSESSMENT & PLAN NOTE
Patient currently sees a pain management.  Will not change her medicines.  She is to follow-up with pain management on a monthly basis.

## 2022-07-19 ENCOUNTER — OFFICE VISIT (OUTPATIENT)
Dept: DERMATOLOGY | Facility: CLINIC | Age: 67
End: 2022-07-19
Payer: COMMERCIAL

## 2022-07-19 VITALS — WEIGHT: 279.31 LBS | RESPIRATION RATE: 18 BRPM | BODY MASS INDEX: 44.89 KG/M2 | HEIGHT: 66 IN

## 2022-07-19 DIAGNOSIS — L91.0 KELOID SCAR: Primary | ICD-10-CM

## 2022-07-19 PROCEDURE — 3008F PR BODY MASS INDEX (BMI) DOCUMENTED: ICD-10-PCS | Mod: CPTII,,, | Performed by: STUDENT IN AN ORGANIZED HEALTH CARE EDUCATION/TRAINING PROGRAM

## 2022-07-19 PROCEDURE — 99499 NO LOS: ICD-10-PCS | Mod: ,,, | Performed by: STUDENT IN AN ORGANIZED HEALTH CARE EDUCATION/TRAINING PROGRAM

## 2022-07-19 PROCEDURE — 3008F BODY MASS INDEX DOCD: CPT | Mod: CPTII,,, | Performed by: STUDENT IN AN ORGANIZED HEALTH CARE EDUCATION/TRAINING PROGRAM

## 2022-07-19 PROCEDURE — 3044F PR MOST RECENT HEMOGLOBIN A1C LEVEL <7.0%: ICD-10-PCS | Mod: CPTII,,, | Performed by: STUDENT IN AN ORGANIZED HEALTH CARE EDUCATION/TRAINING PROGRAM

## 2022-07-19 PROCEDURE — 11900 INJECT SKIN LESIONS </W 7: CPT | Mod: ,,, | Performed by: STUDENT IN AN ORGANIZED HEALTH CARE EDUCATION/TRAINING PROGRAM

## 2022-07-19 PROCEDURE — 1159F PR MEDICATION LIST DOCUMENTED IN MEDICAL RECORD: ICD-10-PCS | Mod: CPTII,,, | Performed by: STUDENT IN AN ORGANIZED HEALTH CARE EDUCATION/TRAINING PROGRAM

## 2022-07-19 PROCEDURE — 99499 UNLISTED E&M SERVICE: CPT | Mod: ,,, | Performed by: STUDENT IN AN ORGANIZED HEALTH CARE EDUCATION/TRAINING PROGRAM

## 2022-07-19 PROCEDURE — 3044F HG A1C LEVEL LT 7.0%: CPT | Mod: CPTII,,, | Performed by: STUDENT IN AN ORGANIZED HEALTH CARE EDUCATION/TRAINING PROGRAM

## 2022-07-19 PROCEDURE — 1159F MED LIST DOCD IN RCRD: CPT | Mod: CPTII,,, | Performed by: STUDENT IN AN ORGANIZED HEALTH CARE EDUCATION/TRAINING PROGRAM

## 2022-07-19 PROCEDURE — 11900 PR INJECTION INTO SKIN LESIONS, UP TO 7: ICD-10-PCS | Mod: ,,, | Performed by: STUDENT IN AN ORGANIZED HEALTH CARE EDUCATION/TRAINING PROGRAM

## 2022-07-19 RX ORDER — TRIAMCINOLONE ACETONIDE 40 MG/ML
40 INJECTION, SUSPENSION INTRA-ARTICULAR; INTRAMUSCULAR
Status: DISCONTINUED | OUTPATIENT
Start: 2022-07-19 | End: 2022-07-19

## 2022-07-19 RX ORDER — TRIAMCINOLONE ACETONIDE 40 MG/ML
40 INJECTION, SUSPENSION INTRA-ARTICULAR; INTRAMUSCULAR
Status: COMPLETED | OUTPATIENT
Start: 2022-07-19 | End: 2022-07-19

## 2022-07-19 RX ADMIN — TRIAMCINOLONE ACETONIDE 40 MG: 40 INJECTION, SUSPENSION INTRA-ARTICULAR; INTRAMUSCULAR at 11:07

## 2022-07-19 NOTE — PROGRESS NOTES
[facility]  Marc Vargas MD    [address] [place] [address], [place] [address]  [phone]    Fax: [phone]    Patient Name: Jeanie Diamond  Medical Record Number: [id number]  PCP: Meek Benitez DO  Age: 67 y.o. : 1955  Contact: [phone] (home)     History of Present Illness:     Jeanie Diamond is a 67 y.o.  female here for follow up of keloid of the L knee following total knee replacement. Previous treatment includes ILK and clobetasol ointment. This has greatly improved the pain but she has some hypopigmentation from the injection and she has some pain on the distal half of the keloid.     The patient has no other concerns today.    Review of Systems:     Unremarkable other than mentioned above.     Physical Exam:     General: Relaxed, oriented, alert    Skin examination of the scalp, face, neck, chest, back, abdomen, upper extremities and lower extremities were normal except for as listed below      Assessment and Plan:     1. 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     Plan: Intralesional Kenalog    Lesions Injected: 4  Medication Injected: 40 mg/cc of Kenalog  Total Volume Injected: 1 cc  NDC #: 8988-3158-42  Lot: [id number]  Expiration: 2023  Administered by: Marc Vargas MD     The risks of atrophy were reviewed with the patient.      Return to clinic in 8 weeks.    AVS printed with patient instructions     Marc Vargas MD   Mohs Surgery/Dermatologic Oncology  Dermatology

## 2022-11-07 DIAGNOSIS — Z96.652 PRESENCE OF LEFT ARTIFICIAL KNEE JOINT: ICD-10-CM

## 2022-11-07 DIAGNOSIS — M25.561 RIGHT KNEE PAIN: Primary | ICD-10-CM

## 2022-11-09 DIAGNOSIS — Z71.89 COMPLEX CARE COORDINATION: ICD-10-CM

## 2022-11-21 ENCOUNTER — CLINICAL SUPPORT (OUTPATIENT)
Dept: REHABILITATION | Facility: HOSPITAL | Age: 67
End: 2022-11-21
Payer: COMMERCIAL

## 2022-11-21 DIAGNOSIS — M25.561 RIGHT KNEE PAIN: ICD-10-CM

## 2022-11-21 DIAGNOSIS — M25.561 RIGHT KNEE PAIN, UNSPECIFIED CHRONICITY: Primary | ICD-10-CM

## 2022-11-21 PROCEDURE — 97161 PT EVAL LOW COMPLEX 20 MIN: CPT | Mod: PN

## 2022-11-21 PROCEDURE — 97110 THERAPEUTIC EXERCISES: CPT | Mod: PN

## 2022-11-21 NOTE — PLAN OF CARE
[facility]   Physical Therapy Initial Evaluation    Date: 11/21/2022   Name: Jeanie Diamond  Clinic Number: [id number]    Therapy Diagnosis:   Encounter Diagnosis   Name Primary?    Right knee pain      Physician: Irving Benitez MD    Physician Orders: PT Eval and Treat    Medical Diagnosis from Referral: right knee pain   Evaluation Date: 11/21/2022  Updated Plan of Care Due : 12/20/2022   Authorization Period Expiration: [employer]   Plan of Care Expiration: [employer]   Visit # / Visits authorized: 1/ awaiting approval     Time In: 1300  Time Out: 1345  Total Appointment Time (timed & untimed codes): 45 minutes    Precautions: Standard    Subjective   Date of onset: pt states she has hx of left knee replacement and has been having right knee pain for about a year.  Pt states she has lateral knee pain right with squatting and bending.  Pt also voices medial knee pain (pes anserine bursitis).   Pt voices patella femoral pain with squatting . Pt voices her knee pain awakes her at night when she shifts over.      History of current condition - Jeanie reports:       Medical History:   Past Medical History:   Diagnosis Date    Depressive disorder     Essential hypertension     Hypothyroidism     Low back pain     Obesity     Other chronic pain     Pain in left knee     Personal history of colonic polyps     Unilateral primary osteoarthritis, left knee        Surgical History:   Jeanie Diamond  has a past surgical history that includes Colonoscopy (04/28/2015); Hysterectomy (1996); Total knee arthroplasty (Left, 07/2021); Rotator cuff repair (Right); and Bilateral oophorectomy.    Medications:   Jeanie has a current medication list which includes the following prescription(s): atenolol-chlorthalidone, bupropion, cetirizine, cetirizine, clobetasol 0.05%, diclofenac sodium, furosemide, gabapentin, hydrocodone-acetaminophen, ibuprofen, ipratropium, levothyroxine, lidocaine-prilocaine, slow-mag, meclizine,  phentermine, potassium chloride, ozempic, sertraline, tizanidine, and trulicity.    Allergies:   Review of patient's allergies indicates:   Allergen Reactions    Macrolide antibiotics         Imaging, bone scan films:      Prior Therapy: none recent for knee   Social History:   lives alone  Occupation: disabled   Prior Level of Function: independent   Current Level of Function:  pain with stooping and bending.     Pain:  Current 6/10, worst 9/10, best 6/10   Location: right joint and knee  generalized   Description: Aching, Dull, Throbbing, Grabbing, and Tight  Aggravating Factors: Standing, Walking, Morning, Extension, Flexing, Lifting, and Getting out of bed/chair  Easing Factors: nothing and rest    Patients goals: quit and be able to bend down and squat without pain     Objective         Observation : flexed knee           Comments :         Range of Motion/Strength :                  Left Extremity                                                                        Right Extremity   AROM PROM Strength  Location  AROM    PROM   Strength   110  4   Hip      Flexion 110  4                               -10  4 Hamstring length  -15     -5  4 Quad lag with terminal knee extension  -15  3+   125  4   Knee    Flexion 125  3+   -5  4                Extension -10  3+   10  5   Ankle   Dorsiflexion 10  5                     Plantar Flexion                        Inversion                        Eversion                     Functional Impairments :  decreased knee range of motion and strength      Limitation/Restriction for FOTO knee Survey    Therapist reviewed FOTO scores for Jeanie Diamond on 11/21/2022.   FOTO documents entered into [employer] - see Media section.    Limitation Score: 46%         TREATMENT         Jeanie received the treatments listed below:  THERAPEUTIC EXERCISES to develop strength, endurance, ROM, flexibility, and posture for 20  minutes including home ex program         Home Exercises and Patient  Education Provided    Education provided:   - Pt performed and received home ex program.     Written Home Exercises Provided: yes.  Exercises were reviewed and Jeanie was able to demonstrate them prior to the end of the session.  Jeanie demonstrated good  understanding of the education provided.     See EMR under Patient Instructions for exercises provided 11/21/2022.    Assessment   Jeanie is a 67 y.o. female referred to outpatient Physical Therapy with a medical diagnosis of right knee pain . Patient presents with decreased knee range of motion , patellafemoral symptoms with lateral tracking patella , chondromalacia symptoms, vmo quad weakness.     Pt had some relief with patella taping to assist vmo    Patient prognosis is Excellent.   Patientt will benefit from skilled outpatient Physical Therapy to address the deficits stated above and in the chart below, provide patient /family education, and to maximize patientt's level of independence.     Plan of care discussed with patient: Yes  Patient's spiritual, cultural and educational needs considered and patient is agreeable to the plan of care and goals as stated below:     Anticipated Barriers for therapy: decreased knee range of motion , patellafemoral symptoms with lateral tracking patella , chondromalacia symptoms, vmo quad weakness.     Goals:  Short Term Goals: 4 weeks   Pt will be independent with home ex program   Pt will be increase range of motion 0-130 degrees   Pt will be able to decrease quad lag from -15 to -5 degrees   Increase quad strength to 4/5     Long Term Goals: 6 weeks   Pt will be able to stoop and bend pain free  Increase quad strength to 5/5   Decrease pain less than 2/10    Plan   Plan of care Certification: 11/21/2022 to 12/20/2022.    Outpatient Physical Therapy 2 times weekly for 4 weeks to include the following interventions: Neuromuscular Re-ed, Patient Education, Therapeutic Exercise, and modalities as needed .     Plan of care has  been reestablished with Marisol PYLE and Angela PYLE.    Bob Shearer, PT             I CERTIFY THE NEED FOR THESE SERVICES FURNISHED UNDER THIS PLAN OF TREATMENT AND WHILE UNDER MY CARE.    Physician's comments:      Physician's Signature: ___________________________________________________

## 2023-01-23 NOTE — PLAN OF CARE
Outpatient Therapy Discharge Summary     Name: Jeanie Diamond  [facility] Number: [id number]    Therapy Diagnosis:   Encounter Diagnoses   Name Primary?    Right knee pain     Right knee pain, unspecified chronicity Yes     Physician: Irving Benitez MD         Assessment    Goals:  Goals not met . Pt only had one visit     Discharge reason: Patient has not attended therapy since 11/21/2022    Plan   This patient is discharged from Physical Therapy.       Bob Shearer, PT

## 2023-01-27 ENCOUNTER — OFFICE VISIT (OUTPATIENT)
Dept: DERMATOLOGY | Facility: CLINIC | Age: 68
End: 2023-01-27
Payer: COMMERCIAL

## 2023-01-27 DIAGNOSIS — L81.0 POST-INFLAMMATORY HYPERPIGMENTATION: Primary | ICD-10-CM

## 2023-01-27 DIAGNOSIS — L91.0 KELOID SCAR: ICD-10-CM

## 2023-01-27 PROCEDURE — 99214 PR OFFICE/OUTPT VISIT, EST, LEVL IV, 30-39 MIN: ICD-10-PCS | Mod: ,,, | Performed by: STUDENT IN AN ORGANIZED HEALTH CARE EDUCATION/TRAINING PROGRAM

## 2023-01-27 PROCEDURE — 1159F MED LIST DOCD IN RCRD: CPT | Mod: CPTII,,, | Performed by: STUDENT IN AN ORGANIZED HEALTH CARE EDUCATION/TRAINING PROGRAM

## 2023-01-27 PROCEDURE — 99214 OFFICE O/P EST MOD 30 MIN: CPT | Mod: ,,, | Performed by: STUDENT IN AN ORGANIZED HEALTH CARE EDUCATION/TRAINING PROGRAM

## 2023-01-27 PROCEDURE — 1159F PR MEDICATION LIST DOCUMENTED IN MEDICAL RECORD: ICD-10-PCS | Mod: CPTII,,, | Performed by: STUDENT IN AN ORGANIZED HEALTH CARE EDUCATION/TRAINING PROGRAM

## 2023-01-27 RX ORDER — HYDROQUINONE 40 MG/G
CREAM TOPICAL 2 TIMES DAILY
Qty: 28.35 G | Refills: 3 | Status: SHIPPED | OUTPATIENT
Start: 2023-01-27 | End: 2023-01-30 | Stop reason: SDUPTHER

## 2023-01-27 NOTE — PROGRESS NOTES
[facility]  Marc Vargas MD    [address], [place] [address]  [phone]    Fax: [phone]    Patient Name: Jeanie Diamond  Medical Record Number: [id number]  PCP: Meek Benitez DO  Age: 68 y.o. : 1955  Contact: [phone] (home)     History of Present Illness:     Jeanie Diamond is a 68 y.o.  female here for follow up of keloid on left knee. Patient last seen 22. Current therapies include ILK injections. The keloid has improved, but she is concerned with hyperpigmentation.     The patient has no other concerns today.    Review of Systems:     Unremarkable other than mentioned above.     Physical Exam:     General: Relaxed, oriented, alert    Skin examination of the scalp, face, neck, chest, back, abdomen, upper extremities and lower extremities were normal except for as listed below      Assessment and Plan:     1.  Post inflammatory hyperpigmentation   - discussed this may take months to resolve, or may be permanent in some cases   - will do hydroquinone 4% bid for 6 months   - instructed to stop rubbing this area     Hydroquinone Counseling: Patient advised that medication may result in skin irritation, lightening (hypopigmentation), dryness, and burning. In the event of skin irritation, the patient was advised to reduce the amount of the drug applied or use it less frequently. Rarely, spots that are treated with hydroquinone can become darker (pseudoochronosis). Should this occur, patient instructed to stop medication and call the office. The patient verbalized understanding of the proper use and possible adverse effects of hydroquinone. All of the patient's questions and concerns were addressed.        2. 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        Return to clinic in   months    AVS printed with patient instructions     Marc Vargas MD   Mohs Surgery/Dermatologic Oncology  Dermatology

## 2023-01-30 DIAGNOSIS — L81.0 POST-INFLAMMATORY HYPERPIGMENTATION: ICD-10-CM

## 2023-01-30 NOTE — TELEPHONE ENCOUNTER
----- Message from Albino Sands sent at 1/30/2023  4:19 PM CST -----  Pt called and asked could her medication be switched to [facility] in [place], [place]  I have attached correct pharmacy and script

## 2023-01-31 RX ORDER — HYDROQUINONE 40 MG/G
CREAM TOPICAL 2 TIMES DAILY
Qty: 28.35 G | Refills: 3 | Status: SHIPPED | OUTPATIENT
Start: 2023-01-31 | End: 2023-03-02

## 2023-02-17 LAB — HM MAMMOGRAM: NORMAL

## 2023-03-31 ENCOUNTER — OFFICE VISIT (OUTPATIENT)
Dept: FAMILY MEDICINE | Facility: CLINIC | Age: 68
End: 2023-03-31
Payer: COMMERCIAL

## 2023-03-31 VITALS
DIASTOLIC BLOOD PRESSURE: 94 MMHG | OXYGEN SATURATION: 97 % | TEMPERATURE: 98 F | HEIGHT: 66 IN | WEIGHT: 289 LBS | SYSTOLIC BLOOD PRESSURE: 126 MMHG | RESPIRATION RATE: 19 BRPM | BODY MASS INDEX: 46.45 KG/M2 | HEART RATE: 61 BPM

## 2023-03-31 DIAGNOSIS — E66.01 CLASS 3 SEVERE OBESITY DUE TO EXCESS CALORIES WITH SERIOUS COMORBIDITY AND BODY MASS INDEX (BMI) OF 45.0 TO 49.9 IN ADULT: ICD-10-CM

## 2023-03-31 DIAGNOSIS — J01.40 ACUTE NON-RECURRENT PANSINUSITIS: ICD-10-CM

## 2023-03-31 DIAGNOSIS — N30.00 ACUTE CYSTITIS WITHOUT HEMATURIA: ICD-10-CM

## 2023-03-31 DIAGNOSIS — F32.A DEPRESSIVE DISORDER: ICD-10-CM

## 2023-03-31 DIAGNOSIS — M54.50 LOW BACK PAIN WITHOUT SCIATICA, UNSPECIFIED BACK PAIN LATERALITY, UNSPECIFIED CHRONICITY: Primary | ICD-10-CM

## 2023-03-31 DIAGNOSIS — I10 ESSENTIAL HYPERTENSION: ICD-10-CM

## 2023-03-31 DIAGNOSIS — M54.9 BACK PAIN, UNSPECIFIED BACK LOCATION, UNSPECIFIED BACK PAIN LATERALITY, UNSPECIFIED CHRONICITY: ICD-10-CM

## 2023-03-31 DIAGNOSIS — Z13.1 SCREENING FOR DIABETES MELLITUS: ICD-10-CM

## 2023-03-31 DIAGNOSIS — E03.9 ACQUIRED HYPOTHYROIDISM: ICD-10-CM

## 2023-03-31 LAB
ALBUMIN SERPL BCP-MCNC: 3.5 G/DL (ref 3.5–5)
ALBUMIN/GLOB SERPL: 0.9 {RATIO}
ALP SERPL-CCNC: 82 U/L (ref 55–142)
ALT SERPL W P-5'-P-CCNC: 17 U/L (ref 13–56)
ANION GAP SERPL CALCULATED.3IONS-SCNC: 7 MMOL/L (ref 7–16)
AST SERPL W P-5'-P-CCNC: 17 U/L (ref 15–37)
BASOPHILS # BLD AUTO: 0.06 K/UL (ref 0–0.2)
BASOPHILS NFR BLD AUTO: 0.9 % (ref 0–1)
BILIRUB SERPL-MCNC: 0.7 MG/DL (ref ?–1.2)
BILIRUB SERPL-MCNC: NORMAL MG/DL
BLOOD URINE, POC: NORMAL
BUN SERPL-MCNC: 9 MG/DL (ref 7–18)
BUN/CREAT SERPL: 9 (ref 6–20)
CALCIUM SERPL-MCNC: 9.2 MG/DL (ref 8.5–10.1)
CHLORIDE SERPL-SCNC: 102 MMOL/L (ref 98–107)
CHOLEST SERPL-MCNC: 199 MG/DL (ref 0–200)
CHOLEST/HDLC SERPL: 3.7 {RATIO}
CO2 SERPL-SCNC: 34 MMOL/L (ref 21–32)
COLOR, POC UA: YELLOW
CREAT SERPL-MCNC: 1.02 MG/DL (ref 0.55–1.02)
DIFFERENTIAL METHOD BLD: ABNORMAL
EGFR (NO RACE VARIABLE) (RUSH/TITUS): 61 ML/MIN/1.73M²
EOSINOPHIL # BLD AUTO: 0.57 K/UL (ref 0–0.5)
EOSINOPHIL NFR BLD AUTO: 8.5 % (ref 1–4)
ERYTHROCYTE [DISTWIDTH] IN BLOOD BY AUTOMATED COUNT: 14.3 % (ref 11.5–14.5)
EST. AVERAGE GLUCOSE BLD GHB EST-MCNC: 117 MG/DL
GLOBULIN SER-MCNC: 4.1 G/DL (ref 2–4)
GLUCOSE SERPL-MCNC: 87 MG/DL (ref 74–106)
GLUCOSE UR QL STRIP: NORMAL
HBA1C MFR BLD HPLC: 6.1 % (ref 4.5–6.6)
HCT VFR BLD AUTO: 41.9 % (ref 38–47)
HDLC SERPL-MCNC: 54 MG/DL (ref 40–60)
HGB BLD-MCNC: 13.5 G/DL (ref 12–16)
IMM GRANULOCYTES # BLD AUTO: 0.02 K/UL (ref 0–0.04)
IMM GRANULOCYTES NFR BLD: 0.3 % (ref 0–0.4)
KETONES UR QL STRIP: NORMAL
LDLC SERPL CALC-MCNC: 113 MG/DL
LDLC/HDLC SERPL: 2.1 {RATIO}
LEUKOCYTE ESTERASE URINE, POC: NORMAL
LYMPHOCYTES # BLD AUTO: 1.13 K/UL (ref 1–4.8)
LYMPHOCYTES NFR BLD AUTO: 16.9 % (ref 27–41)
MCH RBC QN AUTO: 29.1 PG (ref 27–31)
MCHC RBC AUTO-ENTMCNC: 32.2 G/DL (ref 32–36)
MCV RBC AUTO: 90.3 FL (ref 80–96)
MONOCYTES # BLD AUTO: 0.59 K/UL (ref 0–0.8)
MONOCYTES NFR BLD AUTO: 8.8 % (ref 2–6)
MPC BLD CALC-MCNC: 10.4 FL (ref 9.4–12.4)
NEUTROPHILS # BLD AUTO: 4.3 K/UL (ref 1.8–7.7)
NEUTROPHILS NFR BLD AUTO: 64.6 % (ref 53–65)
NITRITE, POC UA: POSITIVE
NONHDLC SERPL-MCNC: 145 MG/DL
NRBC # BLD AUTO: 0 X10E3/UL
NRBC, AUTO (.00): 0 %
PH, POC UA: 6
PLATELET # BLD AUTO: 310 K/UL (ref 150–400)
POTASSIUM SERPL-SCNC: 3.6 MMOL/L (ref 3.5–5.1)
PROT SERPL-MCNC: 7.6 G/DL (ref 6.4–8.2)
PROTEIN, POC: NORMAL
RBC # BLD AUTO: 4.64 M/UL (ref 4.2–5.4)
SODIUM SERPL-SCNC: 139 MMOL/L (ref 136–145)
SPECIFIC GRAVITY, POC UA: 1.01
TRIGL SERPL-MCNC: 158 MG/DL (ref 35–150)
UROBILINOGEN, POC UA: 0.2
VLDLC SERPL-MCNC: 32 MG/DL
WBC # BLD AUTO: 6.67 K/UL (ref 4.5–11)

## 2023-03-31 PROCEDURE — 96372 PR INJECTION,THERAP/PROPH/DIAG2ST, IM OR SUBCUT: ICD-10-PCS | Mod: ,,, | Performed by: FAMILY MEDICINE

## 2023-03-31 PROCEDURE — 99215 OFFICE O/P EST HI 40 MIN: CPT | Mod: 25,,, | Performed by: FAMILY MEDICINE

## 2023-03-31 PROCEDURE — 3044F PR MOST RECENT HEMOGLOBIN A1C LEVEL <7.0%: ICD-10-PCS | Mod: ,,, | Performed by: FAMILY MEDICINE

## 2023-03-31 PROCEDURE — 80053 COMPREHENSIVE METABOLIC PANEL: ICD-10-PCS | Mod: ,,, | Performed by: CLINICAL MEDICAL LABORATORY

## 2023-03-31 PROCEDURE — 81003 POCT URINALYSIS W/O SCOPE: ICD-10-PCS | Mod: QW,,, | Performed by: FAMILY MEDICINE

## 2023-03-31 PROCEDURE — 87086 CULTURE, URINE: ICD-10-PCS | Mod: ,,, | Performed by: CLINICAL MEDICAL LABORATORY

## 2023-03-31 PROCEDURE — 3044F HG A1C LEVEL LT 7.0%: CPT | Mod: ,,, | Performed by: FAMILY MEDICINE

## 2023-03-31 PROCEDURE — 80061 LIPID PANEL: ICD-10-PCS | Mod: ,,, | Performed by: CLINICAL MEDICAL LABORATORY

## 2023-03-31 PROCEDURE — 1159F MED LIST DOCD IN RCRD: CPT | Mod: ,,, | Performed by: FAMILY MEDICINE

## 2023-03-31 PROCEDURE — 80061 LIPID PANEL: CPT | Mod: ,,, | Performed by: CLINICAL MEDICAL LABORATORY

## 2023-03-31 PROCEDURE — 1101F PT FALLS ASSESS-DOCD LE1/YR: CPT | Mod: ,,, | Performed by: FAMILY MEDICINE

## 2023-03-31 PROCEDURE — 3288F PR FALLS RISK ASSESSMENT DOCUMENTED: ICD-10-PCS | Mod: ,,, | Performed by: FAMILY MEDICINE

## 2023-03-31 PROCEDURE — 3008F BODY MASS INDEX DOCD: CPT | Mod: ,,, | Performed by: FAMILY MEDICINE

## 2023-03-31 PROCEDURE — 36415 PR COLLECTION VENOUS BLOOD,VENIPUNCTURE: ICD-10-PCS | Mod: ,,, | Performed by: CLINICAL MEDICAL LABORATORY

## 2023-03-31 PROCEDURE — 3288F FALL RISK ASSESSMENT DOCD: CPT | Mod: ,,, | Performed by: FAMILY MEDICINE

## 2023-03-31 PROCEDURE — 3080F DIAST BP >= 90 MM HG: CPT | Mod: ,,, | Performed by: FAMILY MEDICINE

## 2023-03-31 PROCEDURE — 3008F PR BODY MASS INDEX (BMI) DOCUMENTED: ICD-10-PCS | Mod: ,,, | Performed by: FAMILY MEDICINE

## 2023-03-31 PROCEDURE — 85025 COMPLETE CBC W/AUTO DIFF WBC: CPT | Mod: ,,, | Performed by: CLINICAL MEDICAL LABORATORY

## 2023-03-31 PROCEDURE — 3080F PR MOST RECENT DIASTOLIC BLOOD PRESSURE >= 90 MM HG: ICD-10-PCS | Mod: ,,, | Performed by: FAMILY MEDICINE

## 2023-03-31 PROCEDURE — 85025 CBC WITH DIFFERENTIAL: ICD-10-PCS | Mod: ,,, | Performed by: CLINICAL MEDICAL LABORATORY

## 2023-03-31 PROCEDURE — 87186 SC STD MICRODIL/AGAR DIL: CPT | Mod: ,,, | Performed by: CLINICAL MEDICAL LABORATORY

## 2023-03-31 PROCEDURE — 96372 THER/PROPH/DIAG INJ SC/IM: CPT | Mod: ,,, | Performed by: FAMILY MEDICINE

## 2023-03-31 PROCEDURE — 87077 CULTURE, URINE: ICD-10-PCS | Mod: ,,, | Performed by: CLINICAL MEDICAL LABORATORY

## 2023-03-31 PROCEDURE — 36415 COLL VENOUS BLD VENIPUNCTURE: CPT | Mod: ,,, | Performed by: CLINICAL MEDICAL LABORATORY

## 2023-03-31 PROCEDURE — 1125F PR PAIN SEVERITY QUANTIFIED, PAIN PRESENT: ICD-10-PCS | Mod: ,,, | Performed by: FAMILY MEDICINE

## 2023-03-31 PROCEDURE — 87186 CULTURE, URINE: ICD-10-PCS | Mod: ,,, | Performed by: CLINICAL MEDICAL LABORATORY

## 2023-03-31 PROCEDURE — 1125F AMNT PAIN NOTED PAIN PRSNT: CPT | Mod: ,,, | Performed by: FAMILY MEDICINE

## 2023-03-31 PROCEDURE — 3074F SYST BP LT 130 MM HG: CPT | Mod: ,,, | Performed by: FAMILY MEDICINE

## 2023-03-31 PROCEDURE — 99215 PR OFFICE/OUTPT VISIT, EST, LEVL V, 40-54 MIN: ICD-10-PCS | Mod: 25,,, | Performed by: FAMILY MEDICINE

## 2023-03-31 PROCEDURE — 83036 HEMOGLOBIN A1C: ICD-10-PCS | Mod: GZ,,, | Performed by: CLINICAL MEDICAL LABORATORY

## 2023-03-31 PROCEDURE — 1159F PR MEDICATION LIST DOCUMENTED IN MEDICAL RECORD: ICD-10-PCS | Mod: ,,, | Performed by: FAMILY MEDICINE

## 2023-03-31 PROCEDURE — 87086 URINE CULTURE/COLONY COUNT: CPT | Mod: ,,, | Performed by: CLINICAL MEDICAL LABORATORY

## 2023-03-31 PROCEDURE — 1101F PR PT FALLS ASSESS DOC 0-1 FALLS W/OUT INJ PAST YR: ICD-10-PCS | Mod: ,,, | Performed by: FAMILY MEDICINE

## 2023-03-31 PROCEDURE — 80053 COMPREHEN METABOLIC PANEL: CPT | Mod: ,,, | Performed by: CLINICAL MEDICAL LABORATORY

## 2023-03-31 PROCEDURE — 87077 CULTURE AEROBIC IDENTIFY: CPT | Mod: ,,, | Performed by: CLINICAL MEDICAL LABORATORY

## 2023-03-31 PROCEDURE — 83036 HEMOGLOBIN GLYCOSYLATED A1C: CPT | Mod: GZ,,, | Performed by: CLINICAL MEDICAL LABORATORY

## 2023-03-31 PROCEDURE — 3074F PR MOST RECENT SYSTOLIC BLOOD PRESSURE < 130 MM HG: ICD-10-PCS | Mod: ,,, | Performed by: FAMILY MEDICINE

## 2023-03-31 PROCEDURE — 81003 URINALYSIS AUTO W/O SCOPE: CPT | Mod: QW,,, | Performed by: FAMILY MEDICINE

## 2023-03-31 RX ORDER — FUROSEMIDE 20 MG/1
20 TABLET ORAL DAILY
Qty: 30 TABLET | Refills: 5 | Status: SHIPPED | OUTPATIENT
Start: 2023-03-31 | End: 2023-11-08 | Stop reason: SDUPTHER

## 2023-03-31 RX ORDER — FLUTICASONE PROPIONATE 50 MCG
1 SPRAY, SUSPENSION (ML) NASAL 2 TIMES DAILY
Qty: 15 ML | Refills: 2 | Status: SHIPPED | OUTPATIENT
Start: 2023-03-31 | End: 2024-03-06

## 2023-03-31 RX ORDER — POTASSIUM CHLORIDE 750 MG/1
10 TABLET, EXTENDED RELEASE ORAL DAILY
Qty: 90 TABLET | Refills: 1 | Status: SHIPPED | OUTPATIENT
Start: 2023-03-31 | End: 2023-11-08 | Stop reason: SDUPTHER

## 2023-03-31 RX ORDER — CEFTRIAXONE 1 G/1
1 INJECTION, POWDER, FOR SOLUTION INTRAMUSCULAR; INTRAVENOUS
Status: COMPLETED | OUTPATIENT
Start: 2023-03-31 | End: 2023-03-31

## 2023-03-31 RX ORDER — PROMETHAZINE HYDROCHLORIDE AND DEXTROMETHORPHAN HYDROBROMIDE 6.25; 15 MG/5ML; MG/5ML
5 SYRUP ORAL EVERY 4 HOURS PRN
Qty: 120 ML | Refills: 1 | Status: SHIPPED | OUTPATIENT
Start: 2023-03-31 | End: 2023-04-10

## 2023-03-31 RX ORDER — LEVOTHYROXINE SODIUM 75 UG/1
TABLET ORAL
COMMUNITY
Start: 2023-02-16 | End: 2023-09-06 | Stop reason: SDUPTHER

## 2023-03-31 RX ORDER — AMOXICILLIN AND CLAVULANATE POTASSIUM 875; 125 MG/1; MG/1
1 TABLET, FILM COATED ORAL 2 TIMES DAILY
Qty: 14 TABLET | Refills: 0 | Status: SHIPPED | OUTPATIENT
Start: 2023-03-31 | End: 2024-01-08

## 2023-03-31 RX ORDER — FLUTICASONE PROPIONATE 50 MCG
1 SPRAY, SUSPENSION (ML) NASAL
COMMUNITY
Start: 2023-02-21 | End: 2023-03-31 | Stop reason: SDUPTHER

## 2023-03-31 RX ORDER — GABAPENTIN 100 MG/1
100 CAPSULE ORAL 3 TIMES DAILY
Qty: 90 CAPSULE | Refills: 5 | Status: SHIPPED | OUTPATIENT
Start: 2023-03-31 | End: 2023-11-08 | Stop reason: SDUPTHER

## 2023-03-31 RX ORDER — SERTRALINE HYDROCHLORIDE 50 MG/1
50 TABLET, FILM COATED ORAL DAILY
Qty: 30 TABLET | Refills: 5 | Status: SHIPPED | OUTPATIENT
Start: 2023-03-31 | End: 2023-09-06

## 2023-03-31 RX ORDER — TRAMADOL HYDROCHLORIDE 50 MG/1
50 TABLET ORAL 2 TIMES DAILY PRN
COMMUNITY
Start: 2023-03-25 | End: 2024-01-08

## 2023-03-31 RX ORDER — PHENAZOPYRIDINE HYDROCHLORIDE 200 MG/1
200 TABLET, FILM COATED ORAL 3 TIMES DAILY
COMMUNITY
Start: 2022-10-26 | End: 2024-01-08 | Stop reason: SDUPTHER

## 2023-03-31 RX ORDER — METHYLPREDNISOLONE ACETATE 40 MG/ML
40 INJECTION, SUSPENSION INTRA-ARTICULAR; INTRALESIONAL; INTRAMUSCULAR; SOFT TISSUE
Status: COMPLETED | OUTPATIENT
Start: 2023-03-31 | End: 2023-03-31

## 2023-03-31 RX ORDER — TIZANIDINE 4 MG/1
4 TABLET ORAL 3 TIMES DAILY
Qty: 90 TABLET | Refills: 5 | Status: SHIPPED | OUTPATIENT
Start: 2023-03-31

## 2023-03-31 RX ORDER — ATENOLOL AND CHLORTHALIDONE TABLET 50; 25 MG/1; MG/1
1 TABLET ORAL DAILY
Qty: 90 TABLET | Refills: 1 | Status: SHIPPED | OUTPATIENT
Start: 2023-03-31 | End: 2023-09-06 | Stop reason: SDUPTHER

## 2023-03-31 RX ADMIN — CEFTRIAXONE 1 G: 1 INJECTION, POWDER, FOR SOLUTION INTRAMUSCULAR; INTRAVENOUS at 10:03

## 2023-03-31 RX ADMIN — METHYLPREDNISOLONE ACETATE 40 MG: 40 INJECTION, SUSPENSION INTRA-ARTICULAR; INTRALESIONAL; INTRAMUSCULAR; SOFT TISSUE at 10:03

## 2023-03-31 NOTE — PROGRESS NOTES
Meek Benitez DO   [address]  [place]  [address]      PATIENT NAME: Jeanie Dimaond  : 1955  DATE: 3/31/23  MRN: [id number]      Billing Provider: Meek eBnitez DO  Level of Service:   Patient PCP Information       Provider PCP Type    Meek Benitez DO General            Reason for Visit / Chief Complaint: Diabetes (States she wants her blood sugar checked to see if she is diabetic. ), Hypothyroidism (Lab work), Hypertension (Medication refill), Sinus Problem (X5 days. Head stuffiness,  nasal drainage, patient requesting injection), and Back Pain (X2 weeks. Lower back pain)       Update PCP  Update Chief Complaint         History of Present Illness / Problem Focused Workflow     Jeanie Diamond presents to the clinic with Diabetes (States she wants her blood sugar checked to see if she is diabetic. ), Hypothyroidism (Lab work), Hypertension (Medication refill), Sinus Problem (X5 days. Head stuffiness,  nasal drainage, patient requesting injection), and Back Pain (X2 weeks. Lower back pain)     Patient is in today for numerous problems to include possible diabetes as it does run in her family's.  She does have hypertension is been not well controlled recently.  Patient has been taking her medicines as prescribed.  She does complain today of sinus pressure and pain with a mild cough.  She denies any PND or orthopnea.  Patient denies any chest pain associated with activity.  Patient does have low back pain without radiation into her legs.  She does have some mild dysuria.  He is had no nausea vomiting or diarrhea.      Review of Systems     Review of Systems   Constitutional:  Negative for activity change, appetite change, chills, fatigue and fever.   HENT:  Positive for nasal congestion. Negative for ear discharge, ear pain, mouth dryness, mouth sores, postnasal drip, sinus pressure/congestion, sore throat and voice change.    Eyes:  Negative for pain, discharge,  redness, itching and visual disturbance.   Respiratory:  Positive for cough. Negative for apnea, chest tightness, shortness of breath and wheezing.    Cardiovascular:  Negative for chest pain, palpitations and leg swelling.   Gastrointestinal:  Negative for abdominal distention, abdominal pain, anal bleeding, blood in stool, change in bowel habit, constipation, diarrhea, nausea, vomiting, reflux and change in bowel habit.   Endocrine: Negative for cold intolerance, heat intolerance, polydipsia, polyphagia and polyuria.   Genitourinary:  Positive for frequency. Negative for difficulty urinating, enuresis, genital sores, hematuria, hot flashes, menstrual irregularity, urgency and vaginal dryness.   Musculoskeletal:  Positive for back pain. Negative for arthralgias, gait problem, leg pain, myalgias and neck pain.   Integumentary:  Negative for rash, mole/lesion, breast mass, breast discharge and breast tenderness.   Allergic/Immunologic: Negative for environmental allergies and food allergies.   Neurological:  Negative for dizziness, vertigo, tremors, seizures, syncope, facial asymmetry, speech difficulty, weakness, light-headedness, numbness, headaches, coordination difficulties, memory loss and coordination difficulties.   Hematological:  Negative for adenopathy. Does not bruise/bleed easily.   Psychiatric/Behavioral:  Negative for agitation, behavioral problems, confusion, decreased concentration, dysphoric mood, hallucinations, self-injury, sleep disturbance and suicidal ideas. The patient is not nervous/anxious and is not hyperactive.    Breast: Negative for mass and tenderness    Medical / Social / Family History     Past Medical History:   Diagnosis Date    Depressive disorder     Essential hypertension     Hypothyroidism     Low back pain     Obesity     Other chronic pain     Pain in left knee     Personal history of colonic polyps     Unilateral primary osteoarthritis, left knee        Past Surgical History:    Procedure Laterality Date    BILATERAL OOPHORECTOMY      COLONOSCOPY  04/28/2015    HYSTERECTOMY  1996    ROTATOR CUFF REPAIR Right     x 2    TOTAL KNEE ARTHROPLASTY Left 07/2021    Dr. Irving Benitez       Social History  Ms.  reports that she quit smoking about 23 months ago. Her smoking use included cigarettes. She started smoking about 36 years ago. She has never used smokeless tobacco. She reports current alcohol use. She reports that she does not use drugs.    Family History  Ms.'s family history includes Diabetes in her sister.    Medications and Allergies     Medications  Outpatient Medications Marked as Taking for the 3/31/23 encounter (Office Visit) with Meek Benitez, DO   Medication Sig Dispense Refill    cetirizine (ZYRTEC) 10 MG tablet Take 1 tablet (10 mg total) by mouth once daily. 90 tablet 3    diclofenac sodium (VOLTAREN) 1 % Gel APPLY TO THE AFFECTED AREA(S) FOUR TIMES DAILY FOR 30 DAYS 100 g 5    HYDROcodone-acetaminophen (NORCO)  mg per tablet Pt has cut back to 2 daily until after surgery      levothyroxine (SYNTHROID) 75 MCG tablet 1 tablet in the morning on an empty stomach Orally Once a day for 90 days      LIDOcaine-prilocaine (EMLA) cream Apply topically as needed (for pain). 30 g 0    traMADoL (ULTRAM) 50 mg tablet Take 50 mg by mouth 2 (two) times daily as needed.      TRULICITY 1.5 mg/0.5 mL pen injector INJECT SUBCUTANEOUSLY 1.5 MG (0.5 milliliters) ONCE A WEEK      [DISCONTINUED] atenoloL-chlorthalidone (TENORETIC) 50-25 mg Tab Take 1 tablet by mouth once daily. 90 tablet 1    [DISCONTINUED] fluticasone propionate (FLONASE) 50 mcg/actuation nasal spray 1 spray by Each Nostril route.      [DISCONTINUED] furosemide (LASIX) 20 MG tablet Take 1 tablet (20 mg total) by mouth once daily. 30 tablet 5    [DISCONTINUED] gabapentin (NEURONTIN) 100 MG capsule Take 1 capsule (100 mg total) by mouth 3 (three) times daily. 90 capsule 5    [DISCONTINUED] potassium chloride (KLOR-CON) 10  MEQ TbSR Take 1 tablet (10 mEq total) by mouth once daily. 90 tablet 1    [DISCONTINUED] sertraline (ZOLOFT) 50 MG tablet Take 1 tablet (50 mg total) by mouth once daily. 30 tablet 5    [DISCONTINUED] tiZANidine (ZANAFLEX) 4 MG tablet Take 1 tablet (4 mg total) by mouth 3 (three) times daily. 90 tablet 5       Allergies  Review of patient's allergies indicates:   Allergen Reactions    Macrolide antibiotics        Physical Examination     Vitals:    03/31/23 0919   BP: (!) 126/94   Pulse: 61   Resp: 19   Temp: 97.7 °F (36.5 °C)     Physical Exam  Vitals reviewed.   Constitutional:       General: She is not in acute distress.     Appearance: Normal appearance. She is obese.   HENT:      Head: Normocephalic and atraumatic.      Right Ear: Tympanic membrane normal.      Left Ear: Tympanic membrane normal.      Nose: Congestion present. No rhinorrhea.      Mouth/Throat:      Mouth: Mucous membranes are moist.      Pharynx: Oropharynx is clear. No oropharyngeal exudate or posterior oropharyngeal erythema.   Eyes:      General: No scleral icterus.     Extraocular Movements: Extraocular movements intact.      Conjunctiva/sclera: Conjunctivae normal.      Pupils: Pupils are equal, round, and reactive to light.   Neck:      Vascular: No carotid bruit.   Cardiovascular:      Rate and Rhythm: Normal rate and regular rhythm.      Pulses: Normal pulses.      Heart sounds: Normal heart sounds. No murmur heard.    No gallop.   Pulmonary:      Effort: Pulmonary effort is normal.      Breath sounds: Normal breath sounds. No wheezing.   Abdominal:      General: Abdomen is flat. Bowel sounds are normal.      Palpations: Abdomen is soft.      Tenderness: There is no abdominal tenderness. There is no right CVA tenderness or left CVA tenderness.   Musculoskeletal:         General: Tenderness present.      Cervical back: Normal range of motion and neck supple.      Right lower leg: No edema.      Left lower leg: No edema.    Lymphadenopathy:      Cervical: No cervical adenopathy.   Skin:     General: Skin is warm and dry.      Capillary Refill: Capillary refill takes less than 2 seconds.      Coloration: Skin is not jaundiced.      Findings: No lesion.   Neurological:      General: No focal deficit present.      Mental Status: She is alert and oriented to person, place, and time. Mental status is at baseline.      Cranial Nerves: No cranial nerve deficit.      Sensory: No sensory deficit.      Motor: No weakness.      Coordination: Coordination normal.      Gait: Gait normal.      Deep Tendon Reflexes: Reflexes normal.   Psychiatric:         Mood and Affect: Mood normal.         Behavior: Behavior normal.         Thought Content: Thought content normal.         Judgment: Judgment normal.             Lab Results   Component Value Date    WBC 6.67 03/31/2023    HGB 13.5 03/31/2023    HCT 41.9 03/31/2023    MCV 90.3 03/31/2023     03/31/2023          Sodium   Date Value Ref Range Status   03/31/2023 139 136 - 145 mmol/L Final     Potassium   Date Value Ref Range Status   03/31/2023 3.6 3.5 - 5.1 mmol/L Final     Chloride   Date Value Ref Range Status   03/31/2023 102 98 - 107 mmol/L Final     CO2   Date Value Ref Range Status   03/31/2023 34 (H) 21 - 32 mmol/L Final     Glucose   Date Value Ref Range Status   03/31/2023 87 74 - 106 mg/dL Final     BUN   Date Value Ref Range Status   03/31/2023 9 7 - 18 mg/dL Final     Creatinine   Date Value Ref Range Status   03/31/2023 1.02 0.55 - 1.02 mg/dL Final     Calcium   Date Value Ref Range Status   03/31/2023 9.2 8.5 - 10.1 mg/dL Final     Total Protein   Date Value Ref Range Status   03/31/2023 7.6 6.4 - 8.2 g/dL Final     Albumin   Date Value Ref Range Status   03/31/2023 3.5 3.5 - 5.0 g/dL Final     Bilirubin, Total   Date Value Ref Range Status   03/31/2023 0.7 >0.0 - 1.2 mg/dL Final     Alk Phos   Date Value Ref Range Status   03/31/2023 82 55 - 142 U/L Final     AST   Date Value  Ref Range Status   03/31/2023 17 15 - 37 U/L Final     ALT   Date Value Ref Range Status   03/31/2023 17 13 - 56 U/L Final     Anion Gap   Date Value Ref Range Status   03/31/2023 7 7 - 16 mmol/L Final     eGFR    Date Value Ref Range Status   10/06/2021 73 >=60 mL/min/1.73m² Final     eGFR   Date Value Ref Range Status   06/22/2022 59 (L) >=60 mL/min/1.73m² Final      X-Ray Ankle Complete 3 View Left  Narrative: EXAMINATION:  XR ANKLE COMPLETE 3 VIEW LEFT    CLINICAL HISTORY:  Pain in unspecified ankle and joints of unspecified foot    TECHNIQUE:  AP, lateral and oblique views of the left ankle were performed.    COMPARISON:  None    FINDINGS:  There is prominent soft tissue swelling.  There are moderately severe degenerative changes at the ankle mortise and a large spur at the plantar aspect of the calcaneus.  No fracture or dislocation.  Impression: Soft tissue swelling and moderately severe osteoarthritis.    Electronically signed by: Bridgette Atkinson  Date:    08/05/2021  Time:    12:22     Procedures   Lab Results   Component Value Date    CHOL 199 03/31/2023    CHOL 179 06/22/2022    CHOL 181 10/06/2021     Lab Results   Component Value Date    HDL 54 03/31/2023    HDL 50 06/22/2022    HDL 51 10/06/2021     Lab Results   Component Value Date    LDLCALC 113 03/31/2023    LDLCALC 88 06/22/2022    LDLCALC 93 10/06/2021     Lab Results   Component Value Date    TRIG 158 (H) 03/31/2023    TRIG 204 (H) 06/22/2022    TRIG 185 (H) 10/06/2021     Lab Results   Component Value Date    CHOLHDL 3.7 03/31/2023    CHOLHDL 3.6 06/22/2022    CHOLHDL 3.5 10/06/2021      Lab Results   Component Value Date    HGBA1C 6.1 03/31/2023      Lab Results   Component Value Date    TSH 0.378 10/06/2021    W9LFAOB 10.2 10/06/2021      Assessment and Plan (including Health Maintenance)      Problem List  Smart Sets  Document Outside HM   :    Plan:         Health Maintenance Due   Topic Date Due    Hepatitis C Screening  Never  done    TETANUS VACCINE  Never done    DEXA Scan  Never done    Shingles Vaccine (1 of 2) Never done    Pneumococcal Vaccines (Age 65+) (2 - PCV) 10/05/2017    Colorectal Cancer Screening  10/18/2021    COVID-19 Vaccine (4 - Booster for Moderna series) 03/14/2022    Influenza Vaccine (1) 09/01/2022    Mammogram  11/16/2022       Problem List Items Addressed This Visit          Psychiatric    Depressive disorder    Current Assessment & Plan     Major depression poorly controlled due to noncompliance of the patient not taking her Wellbutrin.  Recommended that she take it.  Recommended counseling for the patient.  Follow-up every 3 months.              ENT    Sinusitis    Current Assessment & Plan     Patient has an acute sinusitis so will treat her with Augmentin twice a day for 7 days and Depo-Medrol 40 IM.  Also use Flonase as well as Atrovent nasal spray.  Follow-up on a p.r.n. basis.           Relevant Medications    fluticasone propionate (FLONASE) 50 mcg/actuation nasal spray    amoxicillin-clavulanate 875-125mg (AUGMENTIN) 875-125 mg per tablet    promethazine-dextromethorphan (PROMETHAZINE-DM) 6.25-15 mg/5 mL Syrp       Cardiac/Vascular    Essential hypertension    Current Assessment & Plan     Patient's blood pressure is poorly controlled due to noncompliance.  Will continue her tennis the long chlorthalidone and Lasix daily for her edema.  Follow-up in 1 month.           Relevant Medications    atenoloL-chlorthalidone (TENORETIC) 50-25 mg Tab    furosemide (LASIX) 20 MG tablet    potassium chloride (KLOR-CON) 10 MEQ TbSR    Other Relevant Orders    Comprehensive Metabolic Panel (Completed)    CBC Auto Differential (Completed)    Lipid Panel (Completed)       Renal/    Acute cystitis without hematuria    Current Assessment & Plan     Acute cystitis that will treat with Augmentin twice a day for 7 days.  Continue her current pain meds to include gabapentin.  She is to follow-up on a p.r.n. basis.  Urine  culture was done today.           Relevant Orders    Urine culture (Completed)       Endocrine    Class 3 severe obesity with serious comorbidity in adult    Current Assessment & Plan     Severe obesity and will need to check some labs on her today to include a CBC CMP and thyroid functions as well as check an A1c as she is had episodes of increased thirst recently.  Discussed weight loss with the patient however while she is ill will not start any weight loss program.  Recommend extensive weight reduction program to include diet of 1500 calories exercising 5 days per week if possible in diet counseling.           Hypothyroidism    Current Assessment & Plan     Clinically euthyroid.  Last TSH was within normal limits.  No change in medication.  Follow-up in 6 months.              Orthopedic    Back pain - Primary    Current Assessment & Plan     Low back pain with dysuria likely urinary tract infection.  Will start her on Augmentin twice a day for 7 days.  Follow-up on a p.r.n. basis.  Tylenol for pain.           Relevant Medications    gabapentin (NEURONTIN) 100 MG capsule    sertraline (ZOLOFT) 50 MG tablet    tiZANidine (ZANAFLEX) 4 MG tablet    Other Relevant Orders    POCT URINALYSIS W/O SCOPE (Completed)     Other Visit Diagnoses       Screening for diabetes mellitus        Relevant Orders    Hemoglobin A1C (Completed)            Health Maintenance Topics with due status: Not Due       Topic Last Completion Date    Hemoglobin A1c (Prediabetes) 03/31/2023    Lipid Panel 03/31/2023       Future Appointments   Date Time Provider Department Center   7/28/2023 10:30 AM Mayra Vargas MD [facility]   10/19/2023  9:00 AM AW[facility] NURSE DECDHARA [facility] PHANI [facility]        Follow up in about 4 weeks (around 4/28/2023), or if symptoms worsen or fail to improve.     Signature:  DO [facility]  [address] [place] [address]  [place]  [address]    Date of encounter: 3/31/23

## 2023-04-02 PROBLEM — N30.00 ACUTE CYSTITIS WITHOUT HEMATURIA: Status: ACTIVE | Noted: 2023-04-02

## 2023-04-02 LAB — UA COMPLETE W REFLEX CULTURE PNL UR: ABNORMAL

## 2023-04-02 NOTE — ASSESSMENT & PLAN NOTE
Acute cystitis that will treat with Augmentin twice a day for 7 days.  Continue her current pain meds to include gabapentin.  She is to follow-up on a p.r.n. basis.  Urine culture was done today.

## 2023-04-02 NOTE — ASSESSMENT & PLAN NOTE
Clinically euthyroid.  Last TSH was within normal limits.  No change in medication.  Follow-up in 6 months.

## 2023-04-02 NOTE — ASSESSMENT & PLAN NOTE
Major depression poorly controlled due to noncompliance of the patient not taking her Wellbutrin.  Recommended that she take it.  Recommended counseling for the patient.  Follow-up every 3 months.

## 2023-04-02 NOTE — ASSESSMENT & PLAN NOTE
Patient's blood pressure is poorly controlled due to noncompliance.  Will continue her tennis the long chlorthalidone and Lasix daily for her edema.  Follow-up in 1 month.

## 2023-04-02 NOTE — ASSESSMENT & PLAN NOTE
Severe obesity and will need to check some labs on her today to include a CBC CMP and thyroid functions as well as check an A1c as she is had episodes of increased thirst recently.  Discussed weight loss with the patient however while she is ill will not start any weight loss program.  Recommend extensive weight reduction program to include diet of 1500 calories exercising 5 days per week if possible in diet counseling.

## 2023-04-02 NOTE — ASSESSMENT & PLAN NOTE
Patient has an acute sinusitis so will treat her with Augmentin twice a day for 7 days and Depo-Medrol 40 IM.  Also use Flonase as well as Atrovent nasal spray.  Follow-up on a p.r.n. basis.

## 2023-06-09 DIAGNOSIS — Z71.89 COMPLEX CARE COORDINATION: ICD-10-CM

## 2023-06-12 DIAGNOSIS — E66.01 CLASS 3 SEVERE OBESITY DUE TO EXCESS CALORIES WITH SERIOUS COMORBIDITY AND BODY MASS INDEX (BMI) OF 45.0 TO 49.9 IN ADULT: Primary | ICD-10-CM

## 2023-06-12 DIAGNOSIS — M54.9 BACK PAIN, UNSPECIFIED BACK LOCATION, UNSPECIFIED BACK PAIN LATERALITY, UNSPECIFIED CHRONICITY: ICD-10-CM

## 2023-06-12 RX ORDER — DULAGLUTIDE 1.5 MG/.5ML
INJECTION, SOLUTION SUBCUTANEOUS
Qty: 4 PEN | Refills: 5 | Status: SHIPPED | OUTPATIENT
Start: 2023-06-12 | End: 2023-11-08 | Stop reason: SDUPTHER

## 2023-06-12 RX ORDER — IBUPROFEN 800 MG/1
800 TABLET ORAL 3 TIMES DAILY
Qty: 90 TABLET | Refills: 5 | Status: SHIPPED | OUTPATIENT
Start: 2023-06-12 | End: 2024-01-08

## 2023-06-15 ENCOUNTER — TELEPHONE (OUTPATIENT)
Dept: FAMILY MEDICINE | Facility: CLINIC | Age: 68
End: 2023-06-15
Payer: COMMERCIAL

## 2023-06-15 DIAGNOSIS — Z12.11 ENCOUNTER FOR SCREENING FOR MALIGNANT NEOPLASM OF COLON: Primary | ICD-10-CM

## 2023-06-15 NOTE — TELEPHONE ENCOUNTER
Pt called requesting a referral to [facility] for a screening colonoscopy stating that she is overdue for her screening.

## 2023-06-29 DIAGNOSIS — Z86.010 HX OF COLONIC POLYPS: Primary | ICD-10-CM

## 2023-09-06 ENCOUNTER — OFFICE VISIT (OUTPATIENT)
Dept: FAMILY MEDICINE | Facility: CLINIC | Age: 68
End: 2023-09-06
Payer: COMMERCIAL

## 2023-09-06 VITALS
TEMPERATURE: 98 F | HEART RATE: 53 BPM | SYSTOLIC BLOOD PRESSURE: 140 MMHG | WEIGHT: 293 LBS | BODY MASS INDEX: 47.09 KG/M2 | DIASTOLIC BLOOD PRESSURE: 70 MMHG | OXYGEN SATURATION: 98 % | HEIGHT: 66 IN

## 2023-09-06 DIAGNOSIS — E66.9 OBESITY, UNSPECIFIED CLASSIFICATION, UNSPECIFIED OBESITY TYPE, UNSPECIFIED WHETHER SERIOUS COMORBIDITY PRESENT: ICD-10-CM

## 2023-09-06 DIAGNOSIS — E03.8 OTHER SPECIFIED HYPOTHYROIDISM: ICD-10-CM

## 2023-09-06 DIAGNOSIS — I10 ESSENTIAL HYPERTENSION: Primary | ICD-10-CM

## 2023-09-06 DIAGNOSIS — F32.A DEPRESSIVE DISORDER: ICD-10-CM

## 2023-09-06 DIAGNOSIS — N39.41 URGE INCONTINENCE OF URINE: ICD-10-CM

## 2023-09-06 PROCEDURE — 3008F BODY MASS INDEX DOCD: CPT | Mod: ,,,

## 2023-09-06 PROCEDURE — 1159F PR MEDICATION LIST DOCUMENTED IN MEDICAL RECORD: ICD-10-PCS | Mod: ,,,

## 2023-09-06 PROCEDURE — 3288F PR FALLS RISK ASSESSMENT DOCUMENTED: ICD-10-PCS | Mod: ,,,

## 2023-09-06 PROCEDURE — 3044F PR MOST RECENT HEMOGLOBIN A1C LEVEL <7.0%: ICD-10-PCS | Mod: ,,,

## 2023-09-06 PROCEDURE — 3077F PR MOST RECENT SYSTOLIC BLOOD PRESSURE >= 140 MM HG: ICD-10-PCS | Mod: ,,,

## 2023-09-06 PROCEDURE — 3288F FALL RISK ASSESSMENT DOCD: CPT | Mod: ,,,

## 2023-09-06 PROCEDURE — 1160F RVW MEDS BY RX/DR IN RCRD: CPT | Mod: ,,,

## 2023-09-06 PROCEDURE — 3078F DIAST BP <80 MM HG: CPT | Mod: ,,,

## 2023-09-06 PROCEDURE — 3078F PR MOST RECENT DIASTOLIC BLOOD PRESSURE < 80 MM HG: ICD-10-PCS | Mod: ,,,

## 2023-09-06 PROCEDURE — 99214 OFFICE O/P EST MOD 30 MIN: CPT | Mod: ,,,

## 2023-09-06 PROCEDURE — 1160F PR REVIEW ALL MEDS BY PRESCRIBER/CLIN PHARMACIST DOCUMENTED: ICD-10-PCS | Mod: ,,,

## 2023-09-06 PROCEDURE — 3044F HG A1C LEVEL LT 7.0%: CPT | Mod: ,,,

## 2023-09-06 PROCEDURE — 1159F MED LIST DOCD IN RCRD: CPT | Mod: ,,,

## 2023-09-06 PROCEDURE — 3077F SYST BP >= 140 MM HG: CPT | Mod: ,,,

## 2023-09-06 PROCEDURE — 99214 PR OFFICE/OUTPT VISIT, EST, LEVL IV, 30-39 MIN: ICD-10-PCS | Mod: ,,,

## 2023-09-06 PROCEDURE — 3008F PR BODY MASS INDEX (BMI) DOCUMENTED: ICD-10-PCS | Mod: ,,,

## 2023-09-06 PROCEDURE — 1101F PR PT FALLS ASSESS DOC 0-1 FALLS W/OUT INJ PAST YR: ICD-10-PCS | Mod: ,,,

## 2023-09-06 PROCEDURE — 1101F PT FALLS ASSESS-DOCD LE1/YR: CPT | Mod: ,,,

## 2023-09-06 RX ORDER — ATENOLOL AND CHLORTHALIDONE TABLET 50; 25 MG/1; MG/1
1 TABLET ORAL DAILY
Qty: 90 TABLET | Refills: 1 | Status: SHIPPED | OUTPATIENT
Start: 2023-09-06 | End: 2023-09-06 | Stop reason: SDUPTHER

## 2023-09-06 RX ORDER — DOXYCYCLINE 100 MG/1
100 CAPSULE ORAL 2 TIMES DAILY
COMMUNITY
Start: 2023-04-18 | End: 2024-01-08 | Stop reason: ALTCHOICE

## 2023-09-06 RX ORDER — SERTRALINE HYDROCHLORIDE 100 MG/1
100 TABLET, FILM COATED ORAL DAILY
Qty: 30 TABLET | Refills: 11 | Status: SHIPPED | OUTPATIENT
Start: 2023-09-06 | End: 2023-11-08

## 2023-09-06 RX ORDER — DEXAMETHASONE SODIUM PHOSPHATE 1 MG/ML
1 SOLUTION/ DROPS OPHTHALMIC
COMMUNITY
Start: 2023-08-22

## 2023-09-06 RX ORDER — PHENTERMINE HYDROCHLORIDE 37.5 MG/1
37.5 TABLET ORAL
Qty: 30 TABLET | Refills: 0 | Status: SHIPPED | OUTPATIENT
Start: 2023-09-06 | End: 2023-10-06

## 2023-09-06 RX ORDER — LEVOTHYROXINE SODIUM 75 UG/1
TABLET ORAL
Qty: 90 TABLET | Refills: 1 | Status: SHIPPED | OUTPATIENT
Start: 2023-09-06 | End: 2023-11-22 | Stop reason: SDUPTHER

## 2023-09-06 RX ORDER — ATENOLOL AND CHLORTHALIDONE TABLET 50; 25 MG/1; MG/1
1 TABLET ORAL DAILY
Qty: 90 TABLET | Refills: 1 | Status: SHIPPED | OUTPATIENT
Start: 2023-09-06 | End: 2024-01-08 | Stop reason: SDUPTHER

## 2023-09-06 RX ORDER — CLINDAMYCIN HYDROCHLORIDE 300 MG/1
300 CAPSULE ORAL 3 TIMES DAILY
COMMUNITY
Start: 2023-06-29 | End: 2024-01-08 | Stop reason: ALTCHOICE

## 2023-09-06 RX ORDER — SERTRALINE HYDROCHLORIDE 50 MG/1
50 TABLET, FILM COATED ORAL DAILY
Qty: 30 TABLET | Refills: 5 | Status: CANCELLED | OUTPATIENT
Start: 2023-09-06

## 2023-09-06 NOTE — ASSESSMENT & PLAN NOTE
Zoloft increased to 100 mg one tablet daily. Denies any thoughts of suicide or self harm.      Reviewed treatment plan and medication side effects/risk/benefits/directions on taking medications. Instructed patient it could take up to 3- 4 weeks before they see full benefit of medication. Patient denies suicidal or homicidal ideations. Should these symptoms develop, encouraged to proceed to the closest ER for additional evaluation and treatment. Instructed patient to return to clinic in 3-4 weeks to discuss effectiveness of drug and any side effects. Patient verbalized understanding of treatment plan and denies any questions.

## 2023-09-06 NOTE — ASSESSMENT & PLAN NOTE
Monitor BP daily and keep BP daily log to bring to next visit. Exercise at least 5 times a week. Keep scheduled appts. RTC sooner if BP is staying <130/80. Dash diet.    DASH- includes foods rich in K+, calcium and Magnesium.The diet limits foods high in sodium, saturated fats and added sugars. This is a flexible balanced eating plan that helps create heart healthy eating style for life. Diet is rich in vegetable, whole grains and fruits. It includes fat free or low fat dairy products, fish, poultry, nuts. Chose foods high in K+, calcium, magnesium, fiber, protein, and low in saturated fats and sodium.

## 2023-09-06 NOTE — PROGRESS NOTES
IVETH MARIN   [place] [id number]  [place]  [address]      PATIENT NAME: Jeanie Diamond  : 1955  DATE: 23  MRN: [id number]      Billing Provider: IVETH MARIN  Level of Service: [place] OFFICE/OUTPT VISIT, EST, LEVL IV, 30-39 MIN  Patient PCP Information       Provider PCP Type    Meek Benitez DO General            Reason for Visit / Chief Complaint: Medication Refill         History of Present Illness / Problem Focused Workflow     Jeanie Diamond presents to the clinic with Medication Refill     69 y/o female presents to clinic for medication refill. She denies any concerns or questions at present.         Review of Systems     @Review of Systems   Constitutional:  Negative for chills, fever and unexpected weight change.   HENT:  Negative for nasal congestion, ear pain, sinus pressure/congestion and sore throat.    Eyes:  Negative for pain.   Respiratory:  Negative for cough, chest tightness, shortness of breath and wheezing.    Cardiovascular:  Negative for chest pain and palpitations.   Gastrointestinal:  Negative for abdominal pain, blood in stool, change in bowel habit, nausea, vomiting and change in bowel habit.   Endocrine: Negative for polydipsia, polyphagia and polyuria.   Genitourinary:  Negative for difficulty urinating, dysuria, frequency and hematuria.   Musculoskeletal:  Negative for arthralgias, back pain, gait problem, joint swelling and neck pain.   Neurological:  Negative for dizziness, seizures, weakness, numbness and headaches.   Psychiatric/Behavioral:  Negative for suicidal ideas.        Medical / Social / Family History     Past Medical History:   Diagnosis Date    Depressive disorder     Essential hypertension     Hypothyroidism     Low back pain     Obesity     Other chronic pain     Pain in left knee     Personal history of colonic polyps     Unilateral primary osteoarthritis, left knee        Past Surgical History:   Procedure  Laterality Date    BILATERAL OOPHORECTOMY      COLONOSCOPY  04/28/2015    HYSTERECTOMY  1996    ROTATOR CUFF REPAIR Right     x 2    TOTAL KNEE ARTHROPLASTY Left 07/2021    Dr. Irving Benitez       Social History  Ms.  reports that she quit smoking about 2 years ago. Her smoking use included cigarettes. She started smoking about 36 years ago. She has never used smokeless tobacco. She reports current alcohol use. She reports that she does not use drugs.    Family History  Ms.'s family history includes Diabetes in her sister.    Medications and Allergies     Medications  Outpatient Medications Marked as Taking for the 9/6/23 encounter (Office Visit) with Higinio Miller FNP   Medication Sig Dispense Refill    amoxicillin-clavulanate 875-125mg (AUGMENTIN) 875-125 mg per tablet Take 1 tablet by mouth 2 (two) times daily. 14 tablet 0    cetirizine (ZYRTEC) 10 MG tablet Take 1 tablet (10 mg total) by mouth once daily. 90 tablet 3    clobetasol 0.05% (TEMOVATE) 0.05 % Oint Apply topically 2 (two) times daily. 60 g 5    diclofenac sodium (VOLTAREN) 1 % Gel APPLY TO THE AFFECTED AREA(S) FOUR TIMES DAILY FOR 30 DAYS 100 g 5    fluticasone propionate (FLONASE) 50 mcg/actuation nasal spray 1 spray (50 mcg total) by Each Nostril route 2 (two) times daily. 15 mL 2    furosemide (LASIX) 20 MG tablet Take 1 tablet (20 mg total) by mouth once daily. 30 tablet 5    gabapentin (NEURONTIN) 100 MG capsule Take 1 capsule (100 mg total) by mouth 3 (three) times daily. 90 capsule 5    HYDROcodone-acetaminophen (NORCO)  mg per tablet Pt has cut back to 2 daily until after surgery      ibuprofen (ADVIL,MOTRIN) 800 MG tablet Take 1 tablet (800 mg total) by mouth 3 (three) times daily. 90 tablet 5    LIDOcaine-prilocaine (EMLA) cream Apply topically as needed (for pain). 30 g 0    phenazopyridine (PYRIDIUM) 200 MG tablet Take 200 mg by mouth 3 (three) times daily.      potassium chloride (KLOR-CON) 10 MEQ TbSR Take 1 tablet (10 mEq  total) by mouth once daily. 90 tablet 1    tiZANidine (ZANAFLEX) 4 MG tablet Take 1 tablet (4 mg total) by mouth 3 (three) times daily. 90 tablet 5    traMADoL (ULTRAM) 50 mg tablet Take 50 mg by mouth 2 (two) times daily as needed.      TRULICITY 1.5 mg/0.5 mL pen injector INJECT SUBCUTANEOUSLY 1.5 MG (0.5 milliliters) ONCE A WEEK 4 pen 5    [DISCONTINUED] atenoloL-chlorthalidone (TENORETIC) 50-25 mg Tab Take 1 tablet by mouth once daily. 90 tablet 1    [DISCONTINUED] levothyroxine (SYNTHROID) 75 MCG tablet 1 tablet in the morning on an empty stomach Orally Once a day for 90 days      [DISCONTINUED] sertraline (ZOLOFT) 50 MG tablet Take 1 tablet (50 mg total) by mouth once daily. 30 tablet 5       Allergies  Review of patient's allergies indicates:   Allergen Reactions    Macrolide antibiotics        Physical Examination     Vitals:    09/06/23 1024   BP: (!) 140/70   Pulse: (!) 53   Temp: 98.1 °F (36.7 °C)     Physical Exam  Vitals and nursing note reviewed.   Constitutional:       General: She is not in acute distress.     Appearance: Normal appearance. She is morbidly obese.   HENT:      Head: Normocephalic.      Right Ear: Tympanic membrane, ear canal and external ear normal.      Left Ear: Tympanic membrane, ear canal and external ear normal.      Nose: Nose normal.      Mouth/Throat:      Lips: Pink.      Mouth: Mucous membranes are moist.      Pharynx: Oropharynx is clear. Uvula midline.   Eyes:      Pupils: Pupils are equal, round, and reactive to light.   Neck:      Vascular: No carotid bruit.   Cardiovascular:      Rate and Rhythm: Normal rate and regular rhythm.      Heart sounds: Normal heart sounds, S1 normal and S2 normal. No murmur heard.     No friction rub. No gallop.   Pulmonary:      Effort: Pulmonary effort is normal. No respiratory distress.      Breath sounds: Normal breath sounds. No decreased breath sounds, wheezing, rhonchi or rales.   Abdominal:      General: Bowel sounds are normal.       Palpations: Abdomen is soft.   Musculoskeletal:         General: No swelling or tenderness. Normal range of motion.      Cervical back: Normal range of motion and neck supple.   Skin:     General: Skin is warm and dry.      Capillary Refill: Capillary refill takes less than 2 seconds.   Neurological:      Mental Status: She is alert and oriented to person, place, and time.   Psychiatric:         Attention and Perception: Attention normal.         Mood and Affect: Mood normal.         Behavior: Behavior normal. Behavior is cooperative.         Thought Content: Thought content does not include homicidal or suicidal ideation.               Lab Results   Component Value Date    WBC 6.67 03/31/2023    HGB 13.5 03/31/2023    HCT 41.9 03/31/2023    MCV 90.3 03/31/2023     03/31/2023        CMP  Sodium   Date Value Ref Range Status   03/31/2023 139 136 - 145 mmol/L Final     Potassium   Date Value Ref Range Status   03/31/2023 3.6 3.5 - 5.1 mmol/L Final     Chloride   Date Value Ref Range Status   03/31/2023 102 98 - 107 mmol/L Final     CO2   Date Value Ref Range Status   03/31/2023 34 (H) 21 - 32 mmol/L Final     Glucose   Date Value Ref Range Status   03/31/2023 87 74 - 106 mg/dL Final     BUN   Date Value Ref Range Status   03/31/2023 9 7 - 18 mg/dL Final     Creatinine   Date Value Ref Range Status   03/31/2023 1.02 0.55 - 1.02 mg/dL Final     Calcium   Date Value Ref Range Status   03/31/2023 9.2 8.5 - 10.1 mg/dL Final     Total Protein   Date Value Ref Range Status   03/31/2023 7.6 6.4 - 8.2 g/dL Final     Albumin   Date Value Ref Range Status   03/31/2023 3.5 3.5 - 5.0 g/dL Final     Bilirubin, Total   Date Value Ref Range Status   03/31/2023 0.7 >0.0 - 1.2 mg/dL Final     Alk Phos   Date Value Ref Range Status   03/31/2023 82 55 - 142 U/L Final     AST   Date Value Ref Range Status   03/31/2023 17 15 - 37 U/L Final     ALT   Date Value Ref Range Status   03/31/2023 17 13 - 56 U/L Final     Anion Gap   Date  Value Ref Range Status   03/31/2023 7 7 - 16 mmol/L Final     eGFR   Date Value Ref Range Status   03/31/2023 61 >=60 mL/min/1.73m² Final     Procedures   Assessment and Plan (including Health Maintenance)   :    Plan:           Problem List Items Addressed This Visit          Psychiatric    Depressive disorder    Current Assessment & Plan     Zoloft increased to 100 mg one tablet daily. Denies any thoughts of suicide or self harm.      Reviewed treatment plan and medication side effects/risk/benefits/directions on taking medications. Instructed patient it could take up to 3- 4 weeks before they see full benefit of medication. Patient denies suicidal or homicidal ideations. Should these symptoms develop, encouraged to proceed to the closest ER for additional evaluation and treatment. Instructed patient to return to clinic in 3-4 weeks to discuss effectiveness of drug and any side effects. Patient verbalized understanding of treatment plan and denies any questions.         Relevant Medications    sertraline (ZOLOFT) 100 MG tablet       Cardiac/Vascular    Essential hypertension - Primary    Current Assessment & Plan     Monitor BP daily and keep BP daily log to bring to next visit. Exercise at least 5 times a week. Keep scheduled appts. RTC sooner if BP is staying <130/80. Dash diet.    DASH- includes foods rich in K+, calcium and Magnesium.The diet limits foods high in sodium, saturated fats and added sugars. This is a flexible balanced eating plan that helps create heart healthy eating style for life. Diet is rich in vegetable, whole grains and fruits. It includes fat free or low fat dairy products, fish, poultry, nuts. Chose foods high in K+, calcium, magnesium, fiber, protein, and low in saturated fats and sodium.         Relevant Medications    atenoloL-chlorthalidone (TENORETIC) 50-25 mg Tab       Renal/    Urge incontinence of urine    Current Assessment & Plan     Pt does have hx of severe obesity and urge  incontinence of urine. She does have to use a high toilet seat. She does us briefs and pull ups regularly due to this. She is to keep future appointments with urology .             Endocrine    Hypothyroidism    Current Assessment & Plan     Clinically euthyroid.  No change in medications.  No labs today.  Follow-up p.r.n..         Relevant Medications    levothyroxine (SYNTHROID) 75 MCG tablet    Obesity    Current Assessment & Plan     Adipex for 1 month only RX. Instructions and education given with understanding voiced.   Instructed patient this is only a tool to help lose weight in addition to diet and exercise. Encouraged patient to use apps such as My Fitness Pal, [employer], or [employer]. Instructed patient to follow up in one month for weigh in and to discuss effectiveness.  Instructed patient to follow up in one month for weigh in and to discuss effectiveness.          Relevant Medications    phentermine (ADIPEX-P) 37.5 mg tablet       Health Maintenance Topics with due status: Not Due       Topic Last Completion Date    Hemoglobin A1c (Prediabetes) 03/31/2023    Lipid Panel 03/31/2023       Future Appointments   Date Time Provider Department Center   9/28/2023  3:15 PM Mayra Vargas MD [facility] DERM [place]   10/19/2023  9:00 AM AW[facility] NURSE [facility] FAM[facility] [place]   10/24/2023 10:30 AM [facility] GI ROOM 02 [facility]   3/6/2024 10:20 AM Higinio Miller FNP [facility] FAMMED [place]        Health Maintenance Due   Topic Date Due    Hepatitis C Screening  Never done    TETANUS VACCINE  Never done    DEXA Scan  Never done    Shingles Vaccine (1 of 2) Never done    Colorectal Cancer Screening  10/18/2021    Pneumococcal Vaccines (Age 65+) (2 - PCV) 01/19/2022    COVID-19 Vaccine (4 - Moderna series) 03/14/2022    Mammogram  11/16/2022    Influenza Vaccine (1) 09/01/2023        Follow up in about 3 months (around 12/6/2023).     Signature:  [facility]  [id number]  [facility] [address]  [place]  [address]    Date of encounter: 9/6/23

## 2023-09-06 NOTE — ASSESSMENT & PLAN NOTE
Adipex for 1 month only RX. Instructions and education given with understanding voiced.   Instructed patient this is only a tool to help lose weight in addition to diet and exercise. Encouraged patient to use apps such as My Fitness Pal, [employer], or [employer]. Instructed patient to follow up in one month for weigh in and to discuss effectiveness.  Instructed patient to follow up in one month for weigh in and to discuss effectiveness.

## 2023-09-19 NOTE — ASSESSMENT & PLAN NOTE
Pt does have hx of severe obesity and urge incontinence of urine. She does have to use a high toilet seat. She does us briefs and pull ups regularly due to this. She is to keep future appointments with urology .

## 2023-09-28 ENCOUNTER — OFFICE VISIT (OUTPATIENT)
Dept: DERMATOLOGY | Facility: CLINIC | Age: 68
End: 2023-09-28
Payer: COMMERCIAL

## 2023-09-28 DIAGNOSIS — L91.0 KELOID SCAR: ICD-10-CM

## 2023-09-28 DIAGNOSIS — L70.9 ACNE, UNSPECIFIED ACNE TYPE: Primary | ICD-10-CM

## 2023-09-28 DIAGNOSIS — L81.0 POST-INFLAMMATORY HYPERPIGMENTATION: ICD-10-CM

## 2023-09-28 PROCEDURE — 3044F PR MOST RECENT HEMOGLOBIN A1C LEVEL <7.0%: ICD-10-PCS | Mod: CPTII,,, | Performed by: STUDENT IN AN ORGANIZED HEALTH CARE EDUCATION/TRAINING PROGRAM

## 2023-09-28 PROCEDURE — 99213 OFFICE O/P EST LOW 20 MIN: CPT | Mod: ,,, | Performed by: STUDENT IN AN ORGANIZED HEALTH CARE EDUCATION/TRAINING PROGRAM

## 2023-09-28 PROCEDURE — 3044F HG A1C LEVEL LT 7.0%: CPT | Mod: CPTII,,, | Performed by: STUDENT IN AN ORGANIZED HEALTH CARE EDUCATION/TRAINING PROGRAM

## 2023-09-28 PROCEDURE — 99213 PR OFFICE/OUTPT VISIT, EST, LEVL III, 20-29 MIN: ICD-10-PCS | Mod: ,,, | Performed by: STUDENT IN AN ORGANIZED HEALTH CARE EDUCATION/TRAINING PROGRAM

## 2023-09-28 RX ORDER — HYDROQUINONE 40 MG/G
CREAM TOPICAL 2 TIMES DAILY
Qty: 28.35 G | Refills: 5 | Status: SHIPPED | OUTPATIENT
Start: 2023-09-28 | End: 2023-10-28

## 2023-09-28 NOTE — PROGRESS NOTES
[facility]  Marc Vargas MD    [address] [place] [address]  [phone]    Fax: [phone]    Patient Name: Jeanie Diamond  Medical Record Number: [id number]  PCP: Meek Benitez DO  Age: 68 y.o. : 1955  Contact: [phone] (home)     History of Present Illness:     Jeanie Diamond is a 68 y.o.  female here for follow up of post inflammatory hyperpigmentation. She did not get the hydroquinone. She is also concerned about acne. She uses coconut oil on her face for moisturizer.     The patient has no other concerns today.    Review of Systems:     Unremarkable other than mentioned above.     Physical Exam:     General: Relaxed, oriented, alert    Skin examination of the scalp, face, neck, chest, back, abdomen, upper extremities and lower extremities were normal except for as listed below      Assessment and Plan:    Post inflammatory hyperpigmentation   - discussed this may take months to resolve, or may be permanent in some cases     - will do hydroquinone 4% bid for 6 months   - instructed to stop rubbing this area      Hydroquinone Counseling: Patient advised that medication may result in skin irritation, lightening (hypopigmentation), dryness, and burning. In the event of skin irritation, the patient was advised to reduce the amount of the drug applied or use it less frequently. Rarely, spots that are treated with hydroquinone can become darker (pseudoochronosis). Should this occur, patient instructed to stop medication and call the office. The patient verbalized understanding of the proper use and possible adverse effects of hydroquinone. All of the patient's questions and concerns were addressed.           2. 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         3. Acne Vulgaris    -inflammatory papules and pustules and comedonal papules on cheeks     Status: flaring     Plan:   - adapalene nightly   - avoiding oil based, comedogenic products     I counseled the regarding the following:  Skin care: I discussed with the patient the importance of using cleansers, moisturizers and cosmetics that are  non-comedogenic.  Expectations: The patient is aware that it may take up to 2-3 months to see a 60-80% improvement of acne.      Return to clinic in 6 months      AVS printed with patient instructions     Marc Vargas MD   Mohs Surgery/Dermatologic Oncology  Dermatology

## 2023-11-08 ENCOUNTER — OFFICE VISIT (OUTPATIENT)
Dept: FAMILY MEDICINE | Facility: CLINIC | Age: 68
End: 2023-11-08
Payer: COMMERCIAL

## 2023-11-08 VITALS
DIASTOLIC BLOOD PRESSURE: 86 MMHG | WEIGHT: 286.63 LBS | BODY MASS INDEX: 46.06 KG/M2 | TEMPERATURE: 98 F | OXYGEN SATURATION: 98 % | HEIGHT: 66 IN | HEART RATE: 64 BPM | SYSTOLIC BLOOD PRESSURE: 132 MMHG | RESPIRATION RATE: 18 BRPM

## 2023-11-08 DIAGNOSIS — R60.0 BILATERAL LOWER EXTREMITY EDEMA: ICD-10-CM

## 2023-11-08 DIAGNOSIS — F32.A DEPRESSIVE DISORDER: ICD-10-CM

## 2023-11-08 DIAGNOSIS — E03.9 ACQUIRED HYPOTHYROIDISM: Primary | ICD-10-CM

## 2023-11-08 DIAGNOSIS — E66.01 CLASS 3 SEVERE OBESITY DUE TO EXCESS CALORIES WITH SERIOUS COMORBIDITY AND BODY MASS INDEX (BMI) OF 45.0 TO 49.9 IN ADULT: ICD-10-CM

## 2023-11-08 DIAGNOSIS — Z20.822 EXPOSURE TO COVID-19 VIRUS: ICD-10-CM

## 2023-11-08 DIAGNOSIS — I10 ESSENTIAL HYPERTENSION: ICD-10-CM

## 2023-11-08 DIAGNOSIS — N39.498 OTHER URINARY INCONTINENCE: ICD-10-CM

## 2023-11-08 DIAGNOSIS — M54.9 BACK PAIN, UNSPECIFIED BACK LOCATION, UNSPECIFIED BACK PAIN LATERALITY, UNSPECIFIED CHRONICITY: ICD-10-CM

## 2023-11-08 LAB
25(OH)D3 SERPL-MCNC: 33 NG/ML
CTP QC/QA: YES
NT-PROBNP SERPL-MCNC: 37 PG/ML (ref 1–125)
SARS-COV-2 AG RESP QL IA.RAPID: NEGATIVE
T4 FREE SERPL-MCNC: 0.86 NG/DL (ref 0.76–1.46)
TSH SERPL DL<=0.005 MIU/L-ACNC: 3.05 UIU/ML (ref 0.36–3.74)

## 2023-11-08 PROCEDURE — 3288F PR FALLS RISK ASSESSMENT DOCUMENTED: ICD-10-PCS | Mod: ,,, | Performed by: FAMILY MEDICINE

## 2023-11-08 PROCEDURE — 3008F BODY MASS INDEX DOCD: CPT | Mod: ,,, | Performed by: FAMILY MEDICINE

## 2023-11-08 PROCEDURE — 1125F AMNT PAIN NOTED PAIN PRSNT: CPT | Mod: ,,, | Performed by: FAMILY MEDICINE

## 2023-11-08 PROCEDURE — 3044F PR MOST RECENT HEMOGLOBIN A1C LEVEL <7.0%: ICD-10-PCS | Mod: ,,, | Performed by: FAMILY MEDICINE

## 2023-11-08 PROCEDURE — 84443 TSH: ICD-10-PCS | Mod: ,,, | Performed by: CLINICAL MEDICAL LABORATORY

## 2023-11-08 PROCEDURE — 3288F FALL RISK ASSESSMENT DOCD: CPT | Mod: ,,, | Performed by: FAMILY MEDICINE

## 2023-11-08 PROCEDURE — 3075F PR MOST RECENT SYSTOLIC BLOOD PRESS GE 130-139MM HG: ICD-10-PCS | Mod: ,,, | Performed by: FAMILY MEDICINE

## 2023-11-08 PROCEDURE — 84443 ASSAY THYROID STIM HORMONE: CPT | Mod: ,,, | Performed by: CLINICAL MEDICAL LABORATORY

## 2023-11-08 PROCEDURE — 3075F SYST BP GE 130 - 139MM HG: CPT | Mod: ,,, | Performed by: FAMILY MEDICINE

## 2023-11-08 PROCEDURE — 3079F DIAST BP 80-89 MM HG: CPT | Mod: ,,, | Performed by: FAMILY MEDICINE

## 2023-11-08 PROCEDURE — 1125F PR PAIN SEVERITY QUANTIFIED, PAIN PRESENT: ICD-10-PCS | Mod: ,,, | Performed by: FAMILY MEDICINE

## 2023-11-08 PROCEDURE — 1101F PR PT FALLS ASSESS DOC 0-1 FALLS W/OUT INJ PAST YR: ICD-10-PCS | Mod: ,,, | Performed by: FAMILY MEDICINE

## 2023-11-08 PROCEDURE — 83880 ASSAY OF NATRIURETIC PEPTIDE: CPT | Mod: ,,, | Performed by: CLINICAL MEDICAL LABORATORY

## 2023-11-08 PROCEDURE — 87426 SARS CORONAVIRUS 2 ANTIGEN POCT: ICD-10-PCS | Mod: QW,,, | Performed by: FAMILY MEDICINE

## 2023-11-08 PROCEDURE — 82306 VITAMIN D 25 HYDROXY: CPT | Mod: GZ,,, | Performed by: CLINICAL MEDICAL LABORATORY

## 2023-11-08 PROCEDURE — 3044F HG A1C LEVEL LT 7.0%: CPT | Mod: ,,, | Performed by: FAMILY MEDICINE

## 2023-11-08 PROCEDURE — 82306 VITAMIN D: ICD-10-PCS | Mod: GZ,,, | Performed by: CLINICAL MEDICAL LABORATORY

## 2023-11-08 PROCEDURE — 1101F PT FALLS ASSESS-DOCD LE1/YR: CPT | Mod: ,,, | Performed by: FAMILY MEDICINE

## 2023-11-08 PROCEDURE — 3008F PR BODY MASS INDEX (BMI) DOCUMENTED: ICD-10-PCS | Mod: ,,, | Performed by: FAMILY MEDICINE

## 2023-11-08 PROCEDURE — 84439 ASSAY OF FREE THYROXINE: CPT | Mod: ,,, | Performed by: CLINICAL MEDICAL LABORATORY

## 2023-11-08 PROCEDURE — 99214 OFFICE O/P EST MOD 30 MIN: CPT | Mod: ,,, | Performed by: FAMILY MEDICINE

## 2023-11-08 PROCEDURE — 84439 T4, FREE: ICD-10-PCS | Mod: ,,, | Performed by: CLINICAL MEDICAL LABORATORY

## 2023-11-08 PROCEDURE — 99214 PR OFFICE/OUTPT VISIT, EST, LEVL IV, 30-39 MIN: ICD-10-PCS | Mod: ,,, | Performed by: FAMILY MEDICINE

## 2023-11-08 PROCEDURE — 83880 NT-PRO NATRIURETIC PEPTIDE: ICD-10-PCS | Mod: ,,, | Performed by: CLINICAL MEDICAL LABORATORY

## 2023-11-08 PROCEDURE — 3079F PR MOST RECENT DIASTOLIC BLOOD PRESSURE 80-89 MM HG: ICD-10-PCS | Mod: ,,, | Performed by: FAMILY MEDICINE

## 2023-11-08 PROCEDURE — 87426 SARSCOV CORONAVIRUS AG IA: CPT | Mod: QW,,, | Performed by: FAMILY MEDICINE

## 2023-11-08 PROCEDURE — 1159F MED LIST DOCD IN RCRD: CPT | Mod: ,,, | Performed by: FAMILY MEDICINE

## 2023-11-08 PROCEDURE — 1159F PR MEDICATION LIST DOCUMENTED IN MEDICAL RECORD: ICD-10-PCS | Mod: ,,, | Performed by: FAMILY MEDICINE

## 2023-11-08 RX ORDER — DULAGLUTIDE 1.5 MG/.5ML
INJECTION, SOLUTION SUBCUTANEOUS
Qty: 4 PEN | Refills: 5 | Status: SHIPPED | OUTPATIENT
Start: 2023-11-08 | End: 2024-01-08 | Stop reason: SDUPTHER

## 2023-11-08 RX ORDER — FUROSEMIDE 20 MG/1
20 TABLET ORAL DAILY
Qty: 90 TABLET | Refills: 1 | Status: SHIPPED | OUTPATIENT
Start: 2023-11-08 | End: 2024-01-08

## 2023-11-08 RX ORDER — POTASSIUM CHLORIDE 750 MG/1
10 TABLET, EXTENDED RELEASE ORAL DAILY
Qty: 90 TABLET | Refills: 1 | Status: SHIPPED | OUTPATIENT
Start: 2023-11-08

## 2023-11-08 RX ORDER — SERTRALINE HYDROCHLORIDE 100 MG/1
100 TABLET, FILM COATED ORAL DAILY
Qty: 90 TABLET | Refills: 1 | Status: SHIPPED | OUTPATIENT
Start: 2023-11-08 | End: 2024-11-07

## 2023-11-08 RX ORDER — GABAPENTIN 300 MG/1
300 CAPSULE ORAL DAILY
Qty: 90 CAPSULE | Refills: 1 | Status: SHIPPED | OUTPATIENT
Start: 2023-11-08 | End: 2024-01-08 | Stop reason: SDUPTHER

## 2023-11-08 RX ORDER — AMOXICILLIN 875 MG/1
875 TABLET, FILM COATED ORAL 2 TIMES DAILY
COMMUNITY
Start: 2023-10-17 | End: 2024-01-08 | Stop reason: ALTCHOICE

## 2023-11-08 NOTE — ASSESSMENT & PLAN NOTE
Patient is currently on Zoloft.  She has questions about duloxetine.  Her antidepressant regimen maybe change in a future date.  Follow up in 1 week.

## 2023-11-08 NOTE — PROGRESS NOTES
11/08/23 0835   Depression Patient Health Questionnaire (PHQ-2)   Over the last two weeks how often have you been bothered by little interest or pleasure in doing things 2   Over the last two weeks how often have you been bothered by feeling down, depressed or hopeless 2   PHQ-2 Total Score 4   Depression Patient Health Questionnaire (PHQ-9)   Over the last two weeks how often have you been bothered by trouble falling or staying asleep, or sleeping too much 2   Over the last two weeks how often have you been bothered by feeling tired or having little energy 3   Over the last two weeks how often have you been bothered by a poor appetite or overeating 2   Over the last two weeks how often have you been bothered by feeling bad about yourself - or that you are a failure or have let yourself or your family down 3   Over the last two weeks how often have you been bothered by trouble concentrating on things, such as reading the newspaper or watching television 3   Over the last two weeks how often have you been bothered by moving or speaking so slowly that other people could have noticed. Or the opposite - being so fidgety or restless that you have been moving around a lot more than usual. 3   Over the last two weeks how often have you been bothered by thoughts that you would be better off dead, or of hurting yourself 0   If you checked off any problems, how difficult have these problems made it for you to do your work, take care of things at home or get along with other people? Very difficult   PHQ-9 Score 20   PHQ-9 Interpretation Severe

## 2023-11-08 NOTE — PROGRESS NOTES
Tyron Montalvo DO   [facility]  [address] [address] [address]  [phone]      PATIENT NAME: Jeanie Diamond  : 1955  DATE: 23  MRN: [id number]      Billing Provider: Tyron Montalvo DO  Level of Service: OR OFFICE/OUTPT VISIT, EST, LEVL IV, 30-39 MIN  Patient PCP Information       Provider PCP Type    Tyron Montalvo DO General            Reason for Visit / Chief Complaint: Medication Refill (Patient here for medication refill), Thyroid Problem (Patient needing lab work for dx of Hypothroidism. Patient has not had TSH drawn according to chart in 2 years), and exposure to Covid (Patient reports about 2 weeks ago she was exposed to someone with Covid and wants to get tested.)       Update PCP  Update Chief Complaint         History of Present Illness / Problem Focused Workflow     Jeanie Diamond presents to the clinic with Medication Refill (Patient here for medication refill), Thyroid Problem (Patient needing lab work for dx of Hypothroidism. Patient has not had TSH drawn according to chart in 2 years), and exposure to Covid (Patient reports about 2 weeks ago she was exposed to someone with Covid and wants to get tested.)     HPI as noted.  Patient is a 68-year-old female presenting for medication refill.  She reports no acute complaints today.  Patient denies chest pain, shortness of breath, palpitations, abdominal pain, nausea, vomiting and diarrhea.  Patient does complain of fatigue which is chronic.  Patient is seen by a cardiologist and states that her cardiac workup thus far has been normal.  Of note, patient scored high on the depression screen today.  She is currently taking sertraline.  Patient has not had her thyroid function checked in some time.  Labs will be drawn today.  With respect to her depression,  Pt has taken Wellbutrin in the past.  She denies suicidal and homicidal ideation as well as visual and auditory hallucinations.  It is  worth mentioning that the patient states that she goes to the gym approximately 3-4 times per week and walks approximately 45 minutes to 1 hour 2 or 3 times a week.    Medication Refill  Associated symptoms include fatigue. Pertinent negatives include no abdominal pain, arthralgias, chest pain, chills, coughing, fever, headaches, myalgias, nausea, numbness, rash, sore throat or vomiting.   Thyroid Problem  Symptoms include fatigue. Patient reports no constipation, diarrhea or palpitations.       Review of Systems     Review of Systems   Constitutional:  Positive for fatigue. Negative for chills and fever.   HENT:  Negative for sore throat.    Eyes:  Negative for visual disturbance.   Respiratory:  Negative for cough and shortness of breath.    Cardiovascular:  Negative for chest pain, palpitations and leg swelling.   Gastrointestinal:  Negative for abdominal pain, constipation, diarrhea, nausea, rectal pain and vomiting.   Genitourinary:  Negative for dysuria and hematuria.   Musculoskeletal:  Negative for arthralgias and myalgias.   Skin:  Negative for rash.   Neurological:  Negative for dizziness, light-headedness, numbness and headaches.       Medical / Social / Family History     Past Medical History:   Diagnosis Date    Depressive disorder     Essential hypertension     Hypothyroidism     Low back pain     Obesity     Other chronic pain     Pain in left knee     Personal history of colonic polyps     Unilateral primary osteoarthritis, left knee        Past Surgical History:   Procedure Laterality Date    BILATERAL OOPHORECTOMY      COLONOSCOPY  04/28/2015    HYSTERECTOMY  1996    ROTATOR CUFF REPAIR Right     x 2    TOTAL KNEE ARTHROPLASTY Left 07/2021    Dr. Irving Benitez       Social History  Ms. Diamond  reports that she quit smoking about 2 years ago. Her smoking use included cigarettes. She started smoking about 36 years ago. She has never used smokeless tobacco. She reports current alcohol  use. She reports that she does not use drugs.    Family History  Ms.'s Lobo family history includes Diabetes in her sister.    Medications and Allergies     Medications  Outpatient Medications Marked as Taking for the 11/8/23 encounter (Office Visit) with Tyron Montalvo, DO   Medication Sig Dispense Refill    atenoloL-chlorthalidone (TENORETIC) 50-25 mg Tab Take 1 tablet by mouth once daily. 90 tablet 1    cetirizine (ZYRTEC) 10 MG tablet Take 1 tablet (10 mg total) by mouth once daily. 90 tablet 3    clobetasol 0.05% (TEMOVATE) 0.05 % Oint Apply topically 2 (two) times daily. 60 g 5    dexAMETHasone (DECADRON) 0.1 % ophthalmic solution Place 1 drop into both eyes.      diclofenac sodium (VOLTAREN) 1 % Gel APPLY TO THE AFFECTED AREA(S) FOUR TIMES DAILY FOR 30 DAYS 100 g 5    fluticasone propionate (FLONASE) 50 mcg/actuation nasal spray 1 spray (50 mcg total) by Each Nostril route 2 (two) times daily. 15 mL 2    HYDROcodone-acetaminophen (NORCO)  mg per tablet Pt has cut back to 2 daily until after surgery      ibuprofen (ADVIL,MOTRIN) 800 MG tablet Take 1 tablet (800 mg total) by mouth 3 (three) times daily. 90 tablet 5    levothyroxine (SYNTHROID) 75 MCG tablet 1 tablet in the morning on an empty stomach Orally Once a day for 90 days 90 tablet 1    tiZANidine (ZANAFLEX) 4 MG tablet Take 1 tablet (4 mg total) by mouth 3 (three) times daily. 90 tablet 5    traMADoL (ULTRAM) 50 mg tablet Take 50 mg by mouth 2 (two) times daily as needed.      [DISCONTINUED] furosemide (LASIX) 20 MG tablet Take 1 tablet (20 mg total) by mouth once daily. 30 tablet 5    [DISCONTINUED] gabapentin (NEURONTIN) 100 MG capsule Take 1 capsule (100 mg total) by mouth 3 (three) times daily. 90 capsule 5    [DISCONTINUED] potassium chloride (KLOR-CON) 10 MEQ TbSR Take 1 tablet (10 mEq total) by mouth once daily. 90 tablet 1    [DISCONTINUED] sertraline (ZOLOFT) 100 MG tablet Take 1 tablet (100 mg total) by mouth once  daily. 30 tablet 11    [DISCONTINUED] TRULICITY 1.5 mg/0.5 mL pen injector INJECT SUBCUTANEOUSLY 1.5 MG (0.5 milliliters) ONCE A WEEK 4 pen 5       Allergies  Review of patient's allergies indicates:   Allergen Reactions    Macrolide antibiotics        Physical Examination     Vitals:    11/08/23 0848   BP: 132/86   Pulse: 64   Resp: 18   Temp: 98 °F (36.7 °C)     Physical Exam  Constitutional:       General: She is not in acute distress.     Appearance: She is obese. She is not ill-appearing, toxic-appearing or diaphoretic.   HENT:      Head: Normocephalic and atraumatic.      Right Ear: External ear normal.      Left Ear: External ear normal.      Mouth/Throat:      Mouth: Mucous membranes are moist.      Pharynx: No oropharyngeal exudate or posterior oropharyngeal erythema.   Eyes:      General: No scleral icterus.        Right eye: No discharge.         Left eye: No discharge.      Extraocular Movements: Extraocular movements intact.      Conjunctiva/sclera: Conjunctivae normal.      Pupils: Pupils are equal, round, and reactive to light.   Neck:      Vascular: No carotid bruit.   Cardiovascular:      Rate and Rhythm: Normal rate and regular rhythm.      Heart sounds: No murmur heard.     No friction rub. No gallop.   Pulmonary:      Effort: No respiratory distress.      Breath sounds: No stridor. No wheezing, rhonchi or rales.   Abdominal:      General: Abdomen is flat. There is no distension.      Tenderness: There is no abdominal tenderness. There is no guarding.   Musculoskeletal:         General: No swelling.      Right lower leg: No edema.      Left lower leg: No edema.   Skin:     General: Skin is warm and dry.      Coloration: Skin is not jaundiced.   Neurological:      Mental Status: She is alert and oriented to person, place, and time.       Assessment and Plan (including Health Maintenance)      Problem List  Smart Sets  Document Outside HM   :    Plan:         Health Maintenance Due   Topic Date  Due    Hepatitis C Screening  Never done    TETANUS VACCINE  Never done    DEXA Scan  Never done    Shingles Vaccine (1 of 2) Never done    RSV Vaccine (Age 60+) (1 - 1-dose 60+ series) Never done    Colorectal Cancer Screening  10/18/2021    Pneumococcal Vaccines (Age 65+) (2 - PCV) 01/19/2022    Mammogram  11/16/2022    COVID-19 Vaccine (4 - 2023-24 season) 09/01/2023       Problem List Items Addressed This Visit          Psychiatric    Depressive disorder    Current Assessment & Plan     Patient is currently on Zoloft.  She has questions about duloxetine.  Her antidepressant regimen maybe change in a future date.  Follow up in 1 week.         Relevant Medications    sertraline (ZOLOFT) 100 MG tablet    Other Relevant Orders    Vitamin D       Cardiac/Vascular    Essential hypertension    Relevant Medications    furosemide (LASIX) 20 MG tablet    potassium chloride (KLOR-CON) 10 MEQ TbSR       Endocrine    Hypothyroidism - Primary    Current Assessment & Plan     Thyroid panel will be obtained today.         Relevant Orders    TSH    T4, Free    Obesity    Relevant Medications    TRULICITY 1.5 mg/0.5 mL pen injector       Orthopedic    Back pain    Relevant Medications    gabapentin (NEURONTIN) 300 MG capsule     Other Visit Diagnoses       Exposure to COVID-19 virus        Relevant Orders    SARS Coronavirus 2 Antigen, POCT (Completed)    Bilateral lower extremity edema        Relevant Orders    NT-Pro Natriuretic Peptide    Other urinary incontinence        Relevant Orders    Ambulatory referral/consult to Urology            Health Maintenance Topics with due status: Not Due       Topic Last Completion Date    Hemoglobin A1c (Prediabetes) 03/31/2023    Lipid Panel 03/31/2023       Future Appointments   Date Time Provider Department Center   11/22/2023 10:00 AM [facility] NURSE [facility]   12/12/2023  8:00 AM [facility] GI ROOM 02 [facility]   3/6/2024 10:20 AM Higinio Miller  FNP [facility] [place]   5/28/2024  9:00 AM Mayra Vargas MD [facility]            Signature:  Tyron Montalvo DO  [facility] [address] [place] [address]  [phone]    Date of encounter: 11/8/23

## 2023-11-20 NOTE — PROGRESS NOTES
"    Jeanie Diamond presented for a  Medicare AWV and comprehensive Health Risk Assessment today. The following components were reviewed and updated:    Medical history  Family History  Social history  Allergies and Current Medications  Health Risk Assessment  Health Maintenance  Care Team         ** See Completed Assessments for Annual Wellness Visit within the encounter summary.**         The following assessments were completed:  Living Situation  CAGE  Depression Screening  Timed Get Up and Go  Whisper Test  Cognitive Function Screening  Nutrition Screening  ADL Screening  PAQ Screening        Vitals:    11/22/23 1000   BP: 136/82   BP Location: Left arm   Patient Position: Sitting   Pulse: 60   Temp: 98.2 °F (36.8 °C)   Weight: 133.8 kg (295 lb 1 oz)   Height: 5' 6" (1.676 m)     Body mass index is 47.62 kg/m².  Physical Exam  Vitals and nursing note reviewed.   Constitutional:       General: She is not in acute distress.     Appearance: She is obese. She is not ill-appearing, toxic-appearing or diaphoretic.   HENT:      Head: Normocephalic and atraumatic.      Right Ear: External ear normal.      Left Ear: External ear normal.      Mouth/Throat:      Mouth: Mucous membranes are moist.   Eyes:      General: No scleral icterus.     Pupils: Pupils are equal, round, and reactive to light.   Cardiovascular:      Rate and Rhythm: Normal rate and regular rhythm.      Heart sounds: Normal heart sounds. No murmur heard.     No friction rub. No gallop.   Pulmonary:      Effort: Pulmonary effort is normal. No respiratory distress.      Breath sounds: Normal breath sounds. No wheezing, rhonchi or rales.   Abdominal:      General: Bowel sounds are normal.      Palpations: Abdomen is soft.      Tenderness: There is no abdominal tenderness. There is no guarding or rebound.   Musculoskeletal:         General: No swelling.      Right lower leg: No edema.      Left lower leg: No edema.   Skin:     General: Skin is warm and dry.    "   Coloration: Skin is not jaundiced.      Findings: No erythema or rash.   Neurological:      Mental Status: She is alert and oriented to person, place, and time.             Diagnoses and health risks identified today and associated recommendations/orders:    Problem List Items Addressed This Visit          Psychiatric    Depressive disorder     Pt currently on sertraline. She feels that her depression is not as controlled as it could be. Pt would like referral to [facility]. Referral to [place] ordered. Pt counseled on the possibility of adjusting or changing her antidepressant regimen at a future date.             Cardiac/Vascular    Essential hypertension     BP appears to be well-controlled. Pt denies any HA, CP, SOB palpitations, and lightheadedness. No change in management at this time.             Endocrine    Hypothyroidism     Last thyroid panel drawn this month was nl. TSH and T4 were wnl. No change in management at this time. Synthroid refilled. F/u at regular visits.          Relevant Medications    levothyroxine (SYNTHROID) 75 MCG tablet     Other Visit Diagnoses       Encounter for initial annual wellness visit (AWV) in Medicare patient    -  Primary    Morbid obesity with body mass index (BMI) of 45.0 to 49.9 in adult      Pt encouraged to engage in 30 minutes of moderate intensity exercise 5x per week. Pt counseled on following a Mediterranean or DASH diet.     Asymptomatic menopausal state        Relevant Orders    DXA Bone Density Axial Skeleton 1 or more sites    Positive depression screening        I have reviewed the positive depression score which warrants active treatment with psychotherapy and/or medications. Sertraline will be continued and Pt is being referred to [place].     Relevant Orders    Ambulatory referral/consult to Behavioral Health            Provided Jeanie with a 5-10 year written screening schedule and personal prevention plan. Recommendations were developed using the USPSTF age  appropriate recommendations. Education, counseling, and referrals were provided as needed. After Visit Summary printed and given to patient which includes a list of additional screenings\tests needed.    Follow up for yearly annual wellness visit    Pt reports she had Mammogram at [facility] and RSV and pneumonia vaccine at [facility] in [place]. Will request records.    I offered to discuss advanced care planning, including how to pick a person who would make decisions for you if you were unable to make them for yourself, called a health care power of , and what kind of decisions you might make such as use of life sustaining treatments such as ventilators and tube feeding when faced with a life limiting illness recorded on a living will that they will need to know. (How you want to be cared for as you near the end of your natural life)     X Patient is interested in learning more about how to make advanced directives.  I provided them paperwork and offered to discuss this with them.  I have used clinical judgement based on duration and functional status to consider definite necessity for treatment.

## 2023-11-22 ENCOUNTER — OFFICE VISIT (OUTPATIENT)
Dept: FAMILY MEDICINE | Facility: CLINIC | Age: 68
End: 2023-11-22
Payer: COMMERCIAL

## 2023-11-22 VITALS
SYSTOLIC BLOOD PRESSURE: 136 MMHG | HEART RATE: 60 BPM | DIASTOLIC BLOOD PRESSURE: 82 MMHG | TEMPERATURE: 98 F | WEIGHT: 293 LBS | HEIGHT: 66 IN | BODY MASS INDEX: 47.09 KG/M2

## 2023-11-22 DIAGNOSIS — Z78.0 ASYMPTOMATIC MENOPAUSAL STATE: ICD-10-CM

## 2023-11-22 DIAGNOSIS — I10 ESSENTIAL HYPERTENSION: ICD-10-CM

## 2023-11-22 DIAGNOSIS — E66.01 MORBID OBESITY WITH BODY MASS INDEX (BMI) OF 45.0 TO 49.9 IN ADULT: ICD-10-CM

## 2023-11-22 DIAGNOSIS — Z00.00 ENCOUNTER FOR INITIAL ANNUAL WELLNESS VISIT (AWV) IN MEDICARE PATIENT: Primary | ICD-10-CM

## 2023-11-22 DIAGNOSIS — Z13.31 POSITIVE DEPRESSION SCREENING: ICD-10-CM

## 2023-11-22 DIAGNOSIS — E03.9 ACQUIRED HYPOTHYROIDISM: ICD-10-CM

## 2023-11-22 DIAGNOSIS — F32.A DEPRESSIVE DISORDER: ICD-10-CM

## 2023-11-22 DIAGNOSIS — E03.8 OTHER SPECIFIED HYPOTHYROIDISM: ICD-10-CM

## 2023-11-22 PROCEDURE — 1159F PR MEDICATION LIST DOCUMENTED IN MEDICAL RECORD: ICD-10-PCS | Mod: ,,, | Performed by: FAMILY MEDICINE

## 2023-11-22 PROCEDURE — 1170F PR FUNCTIONAL STATUS ASSESSED: ICD-10-PCS | Mod: ,,, | Performed by: FAMILY MEDICINE

## 2023-11-22 PROCEDURE — 3044F PR MOST RECENT HEMOGLOBIN A1C LEVEL <7.0%: ICD-10-PCS | Mod: ,,, | Performed by: FAMILY MEDICINE

## 2023-11-22 PROCEDURE — 3288F FALL RISK ASSESSMENT DOCD: CPT | Mod: ,,, | Performed by: FAMILY MEDICINE

## 2023-11-22 PROCEDURE — 3075F PR MOST RECENT SYSTOLIC BLOOD PRESS GE 130-139MM HG: ICD-10-PCS | Mod: ,,, | Performed by: FAMILY MEDICINE

## 2023-11-22 PROCEDURE — G0438 PR WELCOME MEDICARE ANNUAL WELLNESS INITIAL VISIT: ICD-10-PCS | Mod: ,,, | Performed by: FAMILY MEDICINE

## 2023-11-22 PROCEDURE — 1170F FXNL STATUS ASSESSED: CPT | Mod: ,,, | Performed by: FAMILY MEDICINE

## 2023-11-22 PROCEDURE — G0438 PPPS, INITIAL VISIT: HCPCS | Mod: ,,, | Performed by: FAMILY MEDICINE

## 2023-11-22 PROCEDURE — 1125F AMNT PAIN NOTED PAIN PRSNT: CPT | Mod: ,,, | Performed by: FAMILY MEDICINE

## 2023-11-22 PROCEDURE — 3288F PR FALLS RISK ASSESSMENT DOCUMENTED: ICD-10-PCS | Mod: ,,, | Performed by: FAMILY MEDICINE

## 2023-11-22 PROCEDURE — 1125F PR PAIN SEVERITY QUANTIFIED, PAIN PRESENT: ICD-10-PCS | Mod: ,,, | Performed by: FAMILY MEDICINE

## 2023-11-22 PROCEDURE — 1101F PT FALLS ASSESS-DOCD LE1/YR: CPT | Mod: ,,, | Performed by: FAMILY MEDICINE

## 2023-11-22 PROCEDURE — 1101F PR PT FALLS ASSESS DOC 0-1 FALLS W/OUT INJ PAST YR: ICD-10-PCS | Mod: ,,, | Performed by: FAMILY MEDICINE

## 2023-11-22 PROCEDURE — 3079F DIAST BP 80-89 MM HG: CPT | Mod: ,,, | Performed by: FAMILY MEDICINE

## 2023-11-22 PROCEDURE — 3044F HG A1C LEVEL LT 7.0%: CPT | Mod: ,,, | Performed by: FAMILY MEDICINE

## 2023-11-22 PROCEDURE — 1159F MED LIST DOCD IN RCRD: CPT | Mod: ,,, | Performed by: FAMILY MEDICINE

## 2023-11-22 PROCEDURE — 3075F SYST BP GE 130 - 139MM HG: CPT | Mod: ,,, | Performed by: FAMILY MEDICINE

## 2023-11-22 PROCEDURE — 3079F PR MOST RECENT DIASTOLIC BLOOD PRESSURE 80-89 MM HG: ICD-10-PCS | Mod: ,,, | Performed by: FAMILY MEDICINE

## 2023-11-22 RX ORDER — LEVOTHYROXINE SODIUM 75 UG/1
TABLET ORAL
Qty: 90 TABLET | Refills: 1 | Status: SHIPPED | OUTPATIENT
Start: 2023-11-22 | End: 2024-01-08 | Stop reason: SDUPTHER

## 2023-11-22 RX ORDER — DICLOFENAC SODIUM 10 MG/G
2 GEL TOPICAL 3 TIMES DAILY
Qty: 450 G | Refills: 1 | Status: SHIPPED | OUTPATIENT
Start: 2023-11-22

## 2023-11-22 NOTE — ASSESSMENT & PLAN NOTE
Last thyroid panel drawn this month was nl. TSH and T4 were wnl. No change in management at this time. Synthroid refilled. F/u at regular visits.

## 2023-11-22 NOTE — LETTER
AUTHORIZATION FOR RELEASE OF   CONFIDENTIAL INFORMATION    Dear [facility],    We are seeing Jeanie Diamond, date of birth 1955, in the clinic at [facility]. Tyron Montalvo DO is the patient's PCP. Jeanie Diamond has an outstanding lab/procedure at the time we reviewed her chart. In order to help keep her health information updated, she has authorized us to request the following medical record(s):        (  )  MAMMOGRAM                                      (  )  COLONOSCOPY      (  )  PAP SMEAR                                          (  )  OUTSIDE LAB RESULTS     (  )  DEXA SCAN                                          (  )  EYE EXAM            (  )  FOOT EXAM                                          (  )  ENTIRE RECORD     ( X )  OUTSIDE IMMUNIZATIONS      ( X ) RSV, Pneumonia vaccines______________         Please fax records to Tyron Montalvo DO, [phone]     If you have any questions, please contact office at [phone].           Patient Name: Jeanie Diamond  : 1955  Patient Phone #: [phone]

## 2023-11-22 NOTE — ASSESSMENT & PLAN NOTE
Pt currently on sertraline. She feels that her depression is not as controlled as it could be. Pt would like referral to [facility]. Referral to [facility] ordered.

## 2023-11-22 NOTE — PATIENT INSTRUCTIONS
Counseling and Referral of Other Preventative  (Italic type indicates deductible and co-insurance are waived)    Patient Name: Jeanie Diamond  Today's Date: 11/22/2023    Health Maintenance       Date Due Completion Date    Hepatitis C Screening Never done ---    TETANUS VACCINE Never done ---    DEXA Scan Never done ---    Shingles Vaccine (1 of 2) Never done ---    RSV Vaccine (Age 60+ and Pregnant patients) (1 - 1-dose 60+ series) Never done ---    Colorectal Cancer Screening 10/18/2021 10/18/2018    Pneumococcal Vaccines (Age 65+) (2 - PCV) 01/19/2022 10/5/2016    Mammogram 11/16/2022 11/16/2021    COVID-19 Vaccine (4 - 2023-24 season) 09/01/2023 1/17/2022    Hemoglobin A1c (Prediabetes) 03/31/2024 3/31/2023    Lipid Panel 03/31/2024 3/31/2023        No orders of the defined types were placed in this encounter.    Counseling and Referral of Other Preventative  (Italic type indicates deductible and co-insurance are waived)    Patient Name: Jeanie Diamond  Today's Date: 11/22/2023    Health Maintenance       Date Due Completion Date    Hepatitis C Screening Never done ---    TETANUS VACCINE Never done ---    DEXA Scan Never done ---    Shingles Vaccine (1 of 2) Never done ---    RSV Vaccine (Age 60+ and Pregnant patients) (1 - 1-dose 60+ series) Never done ---    Colorectal Cancer Screening 10/18/2021 10/18/2018    Pneumococcal Vaccines (Age 65+) (2 - PCV) 01/19/2022 10/5/2016    Mammogram 11/16/2022 11/16/2021    COVID-19 Vaccine (4 - 2023-24 season) 09/01/2023 1/17/2022    Hemoglobin A1c (Prediabetes) 03/31/2024 3/31/2023    Lipid Panel 03/31/2024 3/31/2023        No orders of the defined types were placed in this encounter.    The following information is provided to all patients.  This information is to help you find resources for any of the problems found today that may be affecting your health:                Living healthy guide: www.[facility].[place].gov      Understanding Diabetes: www.diabetes.org      Eating  healthy: www.cdc.gov/healthyweight      CDC home safety checklist: www.cdc.gov/steadi/patient.html      Agency on Aging: www.goea.[place].gov      Alcoholics anonymous (AA): www.aa.org      Physical Activity: www.minh.nih.gov/hv9gnjh      Tobacco use: www.quitwithusla.org

## 2023-11-22 NOTE — ASSESSMENT & PLAN NOTE
BP appears to be well-controlled. Pt denies any HA, CP, SOB palpitations, and lightheadedness. No change in management at this time.

## 2023-11-27 ENCOUNTER — PATIENT OUTREACH (OUTPATIENT)
Dept: ADMINISTRATIVE | Facility: HOSPITAL | Age: 68
End: 2023-11-27

## 2023-11-27 NOTE — PROGRESS NOTES
Health maintenance record review for population health care gaps    Population Health Chart Review & Patient Outreach Details      Further Action Needed If Patient Returns Outreach:            Updates Requested / Reviewed:     [x]  Care Everywhere    [x]     []  External Sources ([facility], [facility], DIS, etc.)    [] [facility]   [] [facility]   [] Other:    [x]  Care Team Updated   []  Removed  or Duplicate Orders   [x]  Immunization Reconciliation Completed / Queried    [] [place]   [x] [place]   [] [place]   [] [place]      Health Maintenance Topics Addressed and Outreach Outcomes / Actions Taken:             Breast Cancer Screening []  Mammogram Order Placed    []  Mammogram Screening Scheduled    [x]  External Records Requested & Care Team Updated if Applicable    []  External Records Uploaded & Care Team Updated if Applicable    []  Pt Declined Scheduling Mammogram    []  Pt Will Schedule with External Provider / Order Routed & Care Team Updated if Applicable              Cervical Cancer Screening []  Pap Smear Scheduled in Primary Care or OBGYN    []  External Records Requested & Care Team Updated if Applicable       []  External Records Uploaded, Care Team Updated, & History Updated if Applicable    []  Patient Declined Scheduling Pap Smear    []  Patient Will Schedule with External Provider & Care Team Updated if Applicable                  Colorectal Cancer Screening []  Colonoscopy Case Request / Referral / Home Test Order Placed    []  External Records Requested & Care Team Updated if Applicable    []  External Records Uploaded, Care Team Updated, & History Updated if Applicable    []  Patient Declined Completing Colon Cancer Screening    []  Patient Will Schedule with External Provider & Care Team Updated if Applicable    []  Fit Kit Mailed (add the SmartPhrase under additional notes)    []  Reminded Patient to Complete Home Test                Diabetic Eye Exam []  Eye Exam Screening Order  Placed    []  Eye Camera Scheduled or Optometry/Ophthalmology Referral Placed    []  External Records Requested & Care Team Updated if Applicable    []  External Records Uploaded, Care Team Updated, & History Updated if Applicable    []  Patient Declined Scheduling Eye Exam    []  Patient Will Schedule with External Provider & Care Team Updated if Applicable             Blood Pressure Control []  Primary Care Follow Up Visit Scheduled     []  Remote Blood Pressure Reading Captured    []  Patient Declined Remote Reading or Scheduling Appt - Escalated to PCP    []  Patient Will Call Back or Send Portal Message with Reading                 HbA1c & Other Labs []  Overdue Lab(s) Ordered    []  Overdue Lab(s) Scheduled    []  External Records Uploaded & Care Team Updated if Applicable    []  Primary Care Follow Up Visit Scheduled     []  Reminded Patient to Complete A1c Home Test    []  Patient Declined Scheduling Labs or Will Call Back to Schedule    []  Patient Will Schedule with External Provider / Order Routed, & Care Team Updated if Applicable           Primary Care Appointment []  Primary Care Appt Scheduled    []  Patient Declined Scheduling or Will Call Back to Schedule    []  Pt Established with External Provider, Updated Care Team, & Informed Pt to Notify Payor if Applicable           Medication Adherence /    Statin Use []  Primary Care Appointment Scheduled    []  Patient Reminded to  Prescription    []  Patient Declined, Provider Notified if Needed    []  Sent Provider Message to Review to Evaluate Pt for Statin, Add Exclusion Dx Codes, Document   Exclusion in Problem List, Change Statin Intensity Level to Moderate or High Intensity if Applicable                Osteoporosis Screening []  Dexa Order Placed    []  Dexa Appointment Scheduled    []  External Records Requested & Care Team Updated    []  External Records Uploaded, Care Team Updated, & History Updated if Applicable    []  Patient Declined  Scheduling Dexa or Will Call Back to Schedule    []  Patient Will Schedule with External Provider / Order Routed & Care Team Updated if Applicable       Additional Notes:

## 2023-11-27 NOTE — LETTER
AUTHORIZATION FOR RELEASE OF   CONFIDENTIAL INFORMATION    Dear [facility] Medical Records,    We are seeing Jeanie Diamond, date of birth 1955, in the clinic at [facility]. Tyron Montalvo DO is the patient's PCP. Jeanie Diamond has an outstanding lab/procedure at the time we reviewed her chart. In order to help keep her health information updated, she has authorized us to request the following medical record(s):        ( x )  MAMMOGRAM                                      (  )  COLONOSCOPY      (  )  PAP SMEAR                                          (  )  OUTSIDE LAB RESULTS     (  )  DEXA SCAN                                          (  )  EYE EXAM            (  )  FOOT EXAM                                          (  )  ENTIRE RECORD     (  )  OUTSIDE IMMUNIZATIONS                 (  )  _______________         Please fax records to [facility], Menendez, Jose R, DO, [phone]     If you have any questions, please contact Marisa Robles at [phone].           Patient Name: Jeanie Diamond  : 1955  Patient Phone #: [phone]

## 2023-11-29 ENCOUNTER — PATIENT OUTREACH (OUTPATIENT)
Dept: ADMINISTRATIVE | Facility: HOSPITAL | Age: 68
End: 2023-11-29

## 2023-11-29 NOTE — PROGRESS NOTES
Health maintenance record review for population health care gaps    Population Health Chart Review & Patient Outreach Details      Further Action Needed If Patient Returns Outreach:            Updates Requested / Reviewed:     []  Care Everywhere    []     []  External Sources ([facility], [facility], [facility], etc.)    [] [facility]   [] [facility]   [] Other:    []  Care Team Updated   []  Removed  or Duplicate Orders   []  Immunization Reconciliation Completed / Queried    [] [place]   [] [place]   [] [place]   [] [place]      Health Maintenance Topics Addressed and Outreach Outcomes / Actions Taken:             Breast Cancer Screening []  Mammogram Order Placed    []  Mammogram Screening Scheduled    []  External Records Requested & Care Team Updated if Applicable    [x]  External Records Uploaded & Care Team Updated if Applicable    []  Pt Declined Scheduling Mammogram    []  Pt Will Schedule with External Provider / Order Routed & Care Team Updated if Applicable              Cervical Cancer Screening []  Pap Smear Scheduled in Primary Care or OBGYN    []  External Records Requested & Care Team Updated if Applicable       []  External Records Uploaded, Care Team Updated, & History Updated if Applicable    []  Patient Declined Scheduling Pap Smear    []  Patient Will Schedule with External Provider & Care Team Updated if Applicable                  Colorectal Cancer Screening []  Colonoscopy Case Request / Referral / Home Test Order Placed    []  External Records Requested & Care Team Updated if Applicable    []  External Records Uploaded, Care Team Updated, & History Updated if Applicable    []  Patient Declined Completing Colon Cancer Screening    []  Patient Will Schedule with External Provider & Care Team Updated if Applicable    []  Fit Kit Mailed (add the SmartPhrase under additional notes)    []  Reminded Patient to Complete Home Test                Diabetic Eye Exam []  Eye Exam Screening Order Placed     []  Eye Camera Scheduled or Optometry/Ophthalmology Referral Placed    []  External Records Requested & Care Team Updated if Applicable    []  External Records Uploaded, Care Team Updated, & History Updated if Applicable    []  Patient Declined Scheduling Eye Exam    []  Patient Will Schedule with External Provider & Care Team Updated if Applicable             Blood Pressure Control []  Primary Care Follow Up Visit Scheduled     []  Remote Blood Pressure Reading Captured    []  Patient Declined Remote Reading or Scheduling Appt - Escalated to PCP    []  Patient Will Call Back or Send Portal Message with Reading                 HbA1c & Other Labs []  Overdue Lab(s) Ordered    []  Overdue Lab(s) Scheduled    []  External Records Uploaded & Care Team Updated if Applicable    []  Primary Care Follow Up Visit Scheduled     []  Reminded Patient to Complete A1c Home Test    []  Patient Declined Scheduling Labs or Will Call Back to Schedule    []  Patient Will Schedule with External Provider / Order Routed, & Care Team Updated if Applicable           Primary Care Appointment []  Primary Care Appt Scheduled    []  Patient Declined Scheduling or Will Call Back to Schedule    []  Pt Established with External Provider, Updated Care Team, & Informed Pt to Notify Payor if Applicable           Medication Adherence /    Statin Use []  Primary Care Appointment Scheduled    []  Patient Reminded to  Prescription    []  Patient Declined, Provider Notified if Needed    []  Sent Provider Message to Review to Evaluate Pt for Statin, Add Exclusion Dx Codes, Document   Exclusion in Problem List, Change Statin Intensity Level to Moderate or High Intensity if Applicable                Osteoporosis Screening []  Dexa Order Placed    []  Dexa Appointment Scheduled    []  External Records Requested & Care Team Updated    []  External Records Uploaded, Care Team Updated, & History Updated if Applicable    []  Patient Declined Scheduling  Dexa or Will Call Back to Schedule    []  Patient Will Schedule with External Provider / Order Routed & Care Team Updated if Applicable       Additional Notes:

## 2023-12-12 ENCOUNTER — ANESTHESIA (OUTPATIENT)
Dept: GASTROENTEROLOGY | Facility: HOSPITAL | Age: 68
End: 2023-12-12
Payer: COMMERCIAL

## 2023-12-12 ENCOUNTER — HOSPITAL ENCOUNTER (OUTPATIENT)
Dept: GASTROENTEROLOGY | Facility: HOSPITAL | Age: 68
Discharge: HOME | End: 2023-12-12
Attending: INTERNAL MEDICINE
Payer: COMMERCIAL

## 2023-12-12 ENCOUNTER — ANESTHESIA EVENT (OUTPATIENT)
Dept: GASTROENTEROLOGY | Facility: HOSPITAL | Age: 68
End: 2023-12-12
Payer: COMMERCIAL

## 2023-12-12 VITALS
DIASTOLIC BLOOD PRESSURE: 97 MMHG | RESPIRATION RATE: 19 BRPM | TEMPERATURE: 97 F | SYSTOLIC BLOOD PRESSURE: 143 MMHG | OXYGEN SATURATION: 91 % | HEART RATE: 60 BPM

## 2023-12-12 DIAGNOSIS — Z86.010 HX OF COLONIC POLYPS: ICD-10-CM

## 2023-12-12 DIAGNOSIS — Z12.11 SCREENING FOR COLON CANCER: Primary | ICD-10-CM

## 2023-12-12 DIAGNOSIS — K57.30 DIVERTICULOSIS OF COLON: ICD-10-CM

## 2023-12-12 PROBLEM — Z86.0100 HX OF COLONIC POLYPS: Status: ACTIVE | Noted: 2023-12-12

## 2023-12-12 LAB — GLUCOSE SERPL-MCNC: 101 MG/DL (ref 70–105)

## 2023-12-12 PROCEDURE — 63600175 PHARM REV CODE 636 W HCPCS: Performed by: NURSE ANESTHETIST, CERTIFIED REGISTERED

## 2023-12-12 PROCEDURE — G0105 COLORECTAL SCRN; HI RISK IND: HCPCS | Mod: ,,, | Performed by: INTERNAL MEDICINE

## 2023-12-12 PROCEDURE — G0105 COLORECTAL SCRN; HI RISK IND: ICD-10-PCS | Mod: ,,, | Performed by: INTERNAL MEDICINE

## 2023-12-12 PROCEDURE — D9220A PRA ANESTHESIA: ICD-10-PCS | Mod: ,,, | Performed by: NURSE ANESTHETIST, CERTIFIED REGISTERED

## 2023-12-12 PROCEDURE — 37000008 HC ANESTHESIA 1ST 15 MINUTES

## 2023-12-12 PROCEDURE — G0105 COLORECTAL SCRN; HI RISK IND: HCPCS | Performed by: INTERNAL MEDICINE

## 2023-12-12 PROCEDURE — 27000284 HC CANNULA NASAL: Performed by: NURSE ANESTHETIST, CERTIFIED REGISTERED

## 2023-12-12 PROCEDURE — 82962 GLUCOSE BLOOD TEST: CPT

## 2023-12-12 PROCEDURE — D9220A PRA ANESTHESIA: Mod: ,,, | Performed by: NURSE ANESTHETIST, CERTIFIED REGISTERED

## 2023-12-12 PROCEDURE — 25000003 PHARM REV CODE 250: Performed by: NURSE ANESTHETIST, CERTIFIED REGISTERED

## 2023-12-12 RX ORDER — EPHEDRINE SULFATE 50 MG/ML
INJECTION, SOLUTION INTRAVENOUS
Status: DISCONTINUED | OUTPATIENT
Start: 2023-12-12 | End: 2023-12-12

## 2023-12-12 RX ORDER — LIDOCAINE HYDROCHLORIDE 20 MG/ML
INJECTION, SOLUTION EPIDURAL; INFILTRATION; INTRACAUDAL; PERINEURAL
Status: DISCONTINUED | OUTPATIENT
Start: 2023-12-12 | End: 2023-12-12

## 2023-12-12 RX ORDER — SODIUM CHLORIDE 9 MG/ML
INJECTION, SOLUTION INTRAVENOUS CONTINUOUS PRN
Status: DISCONTINUED | OUTPATIENT
Start: 2023-12-12 | End: 2023-12-12

## 2023-12-12 RX ORDER — SODIUM CHLORIDE 0.9 % (FLUSH) 0.9 %
10 SYRINGE (ML) INJECTION
Status: DISCONTINUED | OUTPATIENT
Start: 2023-12-12 | End: 2023-12-13 | Stop reason: HOSPADM

## 2023-12-12 RX ORDER — PROPOFOL 10 MG/ML
VIAL (ML) INTRAVENOUS
Status: DISCONTINUED | OUTPATIENT
Start: 2023-12-12 | End: 2023-12-12

## 2023-12-12 RX ADMIN — PROPOFOL 50 MG: 10 INJECTION, EMULSION INTRAVENOUS at 10:12

## 2023-12-12 RX ADMIN — PROPOFOL 50 MG: 10 INJECTION, EMULSION INTRAVENOUS at 09:12

## 2023-12-12 RX ADMIN — LIDOCAINE HYDROCHLORIDE 100 MG: 20 INJECTION, SOLUTION INTRAVENOUS at 09:12

## 2023-12-12 RX ADMIN — SODIUM CHLORIDE: 9 INJECTION, SOLUTION INTRAVENOUS at 09:12

## 2023-12-12 NOTE — ADDENDUM NOTE
Addendum  created 12/12/23 1428 by Concha Small CRNA    Intraprocedure Meds edited, Orders acknowledged in Narrator

## 2023-12-12 NOTE — H&P
[facility]  Gastroenterology  H&P    Patient Name: Jeanie Diamond  MRN: [id number]  Admission Date: 2023  Code Status: No Order    Attending Provider: Kin Recinos MD   Primary Care Physician: Tyron Montalvo DO  Principal Problem:<principal problem not specified>    Subjective:     History of Present Illness: Pt has history of colon polyps. Her last colonoscopy was five years ago.    Past Medical History:   Diagnosis Date    Depressive disorder     Essential hypertension     Hypothyroidism     Low back pain     Obesity     Other chronic pain     Pain in left knee     Personal history of colonic polyps     Unilateral primary osteoarthritis, left knee        Past Surgical History:   Procedure Laterality Date    BILATERAL OOPHORECTOMY      COLONOSCOPY  2015    HYSTERECTOMY  1996    ROTATOR CUFF REPAIR Right     x 2    TOTAL KNEE ARTHROPLASTY Left 2021    Dr. Irving Benitez       Review of patient's allergies indicates:   Allergen Reactions    Macrolide antibiotics      Family History       Problem Relation (Age of Onset)    Diabetes Sister          Tobacco Use    Smoking status: Former     Current packs/day: 0.00     Types: Cigarettes     Start date:      Quit date: 2021     Years since quittin.6    Smokeless tobacco: Never   Substance and Sexual Activity    Alcohol use: Yes     Comment: occasionally a glass of wine     Drug use: Never    Sexual activity: Yes     Partners: Male     Review of Systems   Respiratory: Negative.     Cardiovascular: Negative.    Gastrointestinal: Negative.      Objective:     Vital Signs (Most Recent):  Pulse: 68 (23 0843)  Resp: 13 (23 0843)  BP: 111/70 (23 0843)  SpO2: 98 % (23 0843) Vital Signs (24h Range):  Pulse:  [68] 68  Resp:  [13] 13  SpO2:  [98 %] 98 %  BP: (111)/(70) 111/70        There is no height or weight on file to calculate BMI.    No intake or output data in the 24 hours ending 23  "0952    Lines/Drains/Airways       Peripheral Intravenous Line  Duration                  Peripheral IV - Single Lumen 12/12/23 0900 24 G Posterior;Right Hand <1 day                    Physical Exam  Vitals reviewed.   Constitutional:       General: She is not in acute distress.     Appearance: Normal appearance. She is well-developed. She is not ill-appearing.   HENT:      Head: Normocephalic and atraumatic.      Nose: Nose normal.   Eyes:      Pupils: Pupils are equal, round, and reactive to light.   Cardiovascular:      Rate and Rhythm: Normal rate and regular rhythm.   Pulmonary:      Effort: Pulmonary effort is normal.      Breath sounds: Normal breath sounds. No wheezing.   Abdominal:      General: Abdomen is flat. Bowel sounds are normal. There is no distension.      Palpations: Abdomen is soft.      Tenderness: There is no abdominal tenderness. There is no guarding.   Skin:     General: Skin is warm and dry.      Coloration: Skin is not jaundiced.   Neurological:      Mental Status: She is alert.   Psychiatric:         Attention and Perception: Attention normal.         Mood and Affect: Affect normal.         Speech: Speech normal.         Behavior: Behavior is cooperative.      Comments: Pt was calm while speaking.         Significant Labs:  CBC: No results for input(s): "WBC", "HGB", "HCT", "PLT" in the last 48 hours.  CMP: No results for input(s): "GLU", "CALCIUM", "ALBUMIN", "PROT", "NA", "K", "CO2", "CL", "BUN", "CREATININE", "ALKPHOS", "ALT", "AST", "BILITOT" in the last 48 hours.    Significant Imaging:  Imaging results within the past 24 hours have been reviewed.    Assessment/Plan:     There are no hospital problems to display for this patient.        Imp: history of colon polyps  Plan: colonoscopy    Kin Recinos MD  Gastroenterology  [facility]  "

## 2023-12-12 NOTE — TRANSFER OF CARE
Anesthesia Transfer of Care Note    Patient: Jeanie Diamond    Procedure(s) Performed: * No procedures listed *    Patient location: GI    Anesthesia Type: general    Transport from OR: Transported from OR on room air with adequate spontaneous ventilation    Post pain: adequate analgesia    Post assessment: no apparent anesthetic complications    Post vital signs: stable    Level of consciousness: responds to stimulation    Nausea/Vomiting: no nausea/vomiting    Complications: none    Transfer of care protocol was followed      Last vitals: Visit Vitals  /78 (BP Location: Left arm, Patient Position: Lying)   Pulse 62   Temp 36.1 °C (97 °F) (Oral)   Resp 16   SpO2 97%   Breastfeeding No

## 2023-12-12 NOTE — DISCHARGE INSTRUCTIONS
12/12/2023 10:18:28 AM - [facility] BLACK]Procedure Date  12/12/23     Impression  Overall Impression:   Diverticulosis in the descending colon and sigmoid colon  Scattered diverticula were noted in the sigmoid colon. No polyps were seen, but retained opaque liquid was present in the colon.     Recommendation    Repeat colonoscopy in 5 years      Discharge: disp: DC to home. High fiber diet. No driving x 24 hours. F/U with PCP as scheduled.  Dx: colon diverticulosis, history of colon polyps.    NO DRIVING, OPERATING EQUIPMENT, OR SIGNING LEGAL DOCUMENTS FOR 24 HOURS.  THE NURSE WILL CALL YOU WITH YOUR BIOPSY RESULTS IN A FEW DAYS.

## 2023-12-12 NOTE — ANESTHESIA POSTPROCEDURE EVALUATION
Anesthesia Post Evaluation    Patient: Jeanie Diamond    Procedure(s) Performed: * No procedures listed *    Final Anesthesia Type: general      Patient location during evaluation: PACU  Patient participation: Yes- Able to Participate  Level of consciousness: responds to stimulation  Post-procedure vital signs: reviewed and stable  Pain management: adequate  Airway patency: patent  BERTIN mitigation strategies: Multimodal analgesia  PONV status at discharge: No PONV  Anesthetic complications: no      Cardiovascular status: hemodynamically stable  Respiratory status: unassisted, spontaneous ventilation and room air  Hydration status: euvolemic  Follow-up not needed.  Comments: Refer to nursing note for pain/jody score upon discharge from recovery.               Vitals Value Taken Time   /78 12/12/23 1022   Temp 36.1 °C (97 °F) 12/12/23 1022   Pulse 55 12/12/23 1022   Resp 12 12/12/23 1022   SpO2 96 % 12/12/23 1022   Vitals shown include unvalidated device data.      No case tracking events are documented in the log.      Pain/Jody Score: Jody Score: 8 (12/12/2023 10:14 AM)

## 2023-12-12 NOTE — ANESTHESIA PREPROCEDURE EVALUATION
2023  Jeanie Diamond is a 68 y.o., female.    Social History     Socioeconomic History    Marital status:    Tobacco Use    Smoking status: Former     Current packs/day: 0.00     Types: Cigarettes     Start date:      Quit date: 2021     Years since quittin.6    Smokeless tobacco: Never   Substance and Sexual Activity    Alcohol use: Yes     Comment: occasionally a glass of wine     Drug use: Never    Sexual activity: Yes     Partners: Male     Social Determinants of Health     Financial Resource Strain: High Risk (2023)    Overall Financial Resource Strain (CARDIA)     Difficulty of Paying Living Expenses: Very hard   Food Insecurity: Food Insecurity Present (2023)    Hunger Vital Sign     Worried About Running Out of Food in the Last Year: Often true     Ran Out of Food in the Last Year: Often true   Transportation Needs: No Transportation Needs (2023)    PRAPARE - Transportation     Lack of Transportation (Medical): No     Lack of Transportation (Non-Medical): No   Physical Activity: Sufficiently Active (2023)    Exercise Vital Sign     Days of Exercise per Week: 2 days     Minutes of Exercise per Session: 120 min   Stress: Stress Concern Present (2023)    [employer] [place] [employer] - Occupational Stress Questionnaire     Feeling of Stress : Very much   Social Connections: Moderately Isolated (2023)    Social Connection and Isolation Panel [NHANES]     Frequency of Communication with Friends and Family: More than three times a week     Frequency of Social Gatherings with Friends and Family: Never     Attends Restorationism Services: More than 4 times per year     Active Member of Clubs or Organizations: No     Attends Club or Organization Meetings: Never     Marital Status:    Housing Stability: Low Risk  (2023)    Housing  Stability Vital Sign     Unable to Pay for Housing in the Last Year: No     Number of Places Lived in the Last Year: 1     Unstable Housing in the Last Year: No      Pre-op Assessment    I have reviewed the Patient Summary Reports.     I have reviewed the Nursing Notes. I have reviewed the NPO Status.   I have reviewed the Medications.     Review of Systems  Anesthesia Hx:  No problems with previous Anesthesia                Social:  Smoker       Cardiovascular:     Hypertension                                        Musculoskeletal:  Arthritis               Neurological:     Pain Syndrome   Chronic Pain Syndrome                         Endocrine:   Hypothyroidism          Psych:  Psychiatric History  depression              Past Medical History:   Diagnosis Date    Depressive disorder     Essential hypertension     Hypothyroidism     Low back pain     Obesity     Other chronic pain     Pain in left knee     Personal history of colonic polyps     Unilateral primary osteoarthritis, left knee       Past Surgical History:   Procedure Laterality Date    BILATERAL OOPHORECTOMY      COLONOSCOPY  04/28/2015    HYSTERECTOMY  1996    ROTATOR CUFF REPAIR Right     x 2    TOTAL KNEE ARTHROPLASTY Left 07/2021    Dr. Irving Benitez        Physical Exam  General: Well nourished, Cooperative, Alert and Oriented    Airway:  Mallampati: II     Chest/Lungs:  Clear to auscultation    Heart:  Rate: Normal        Anesthesia Plan  Type of Anesthesia, risks & benefits discussed:    Anesthesia Type: Gen Natural Airway, MAC  Intra-op Monitoring Plan: Standard ASA Monitors  Post Op Pain Control Plan: multimodal analgesia and IV/PO Opioids PRN  Induction:  IV  Informed Consent: Informed consent signed with the Patient and all parties understand the risks and agree with anesthesia plan.  All questions answered. Patient consented to blood products? Yes  ASA Score: 3  Day of Surgery Review of History & Physical: I have interviewed and examined  the patient. I have reviewed the patient's H&P dated: There are no significant changes.     Ready For Surgery From Anesthesia Perspective.     .

## 2024-01-08 ENCOUNTER — OFFICE VISIT (OUTPATIENT)
Dept: FAMILY MEDICINE | Facility: CLINIC | Age: 69
End: 2024-01-08
Payer: MEDICARE

## 2024-01-08 VITALS
RESPIRATION RATE: 17 BRPM | SYSTOLIC BLOOD PRESSURE: 122 MMHG | BODY MASS INDEX: 47.09 KG/M2 | WEIGHT: 293 LBS | OXYGEN SATURATION: 98 % | HEIGHT: 66 IN | TEMPERATURE: 98 F | DIASTOLIC BLOOD PRESSURE: 74 MMHG

## 2024-01-08 DIAGNOSIS — E03.8 OTHER SPECIFIED HYPOTHYROIDISM: ICD-10-CM

## 2024-01-08 DIAGNOSIS — E66.01 CLASS 3 SEVERE OBESITY DUE TO EXCESS CALORIES WITH SERIOUS COMORBIDITY AND BODY MASS INDEX (BMI) OF 45.0 TO 49.9 IN ADULT: ICD-10-CM

## 2024-01-08 DIAGNOSIS — J32.9 SINUSITIS, UNSPECIFIED CHRONICITY, UNSPECIFIED LOCATION: ICD-10-CM

## 2024-01-08 DIAGNOSIS — R30.0 DYSURIA: ICD-10-CM

## 2024-01-08 DIAGNOSIS — I10 ESSENTIAL HYPERTENSION: Primary | ICD-10-CM

## 2024-01-08 DIAGNOSIS — M54.9 BACK PAIN, UNSPECIFIED BACK LOCATION, UNSPECIFIED BACK PAIN LATERALITY, UNSPECIFIED CHRONICITY: ICD-10-CM

## 2024-01-08 DIAGNOSIS — F32.A DEPRESSIVE DISORDER: ICD-10-CM

## 2024-01-08 LAB
BILIRUB SERPL-MCNC: NEGATIVE MG/DL
BLOOD URINE, POC: ABNORMAL
COLOR, POC UA: YELLOW
GLUCOSE UR QL STRIP: NEGATIVE
KETONES UR QL STRIP: NEGATIVE
LEUKOCYTE ESTERASE URINE, POC: NEGATIVE
NITRITE, POC UA: NEGATIVE
PH, POC UA: 5.5
PROTEIN, POC: NEGATIVE
SPECIFIC GRAVITY, POC UA: 1.02
UROBILINOGEN, POC UA: ABNORMAL

## 2024-01-08 PROCEDURE — 1159F MED LIST DOCD IN RCRD: CPT | Mod: ,,, | Performed by: NURSE PRACTITIONER

## 2024-01-08 PROCEDURE — 3288F FALL RISK ASSESSMENT DOCD: CPT | Mod: ,,, | Performed by: NURSE PRACTITIONER

## 2024-01-08 PROCEDURE — 99214 OFFICE O/P EST MOD 30 MIN: CPT | Mod: ,,, | Performed by: NURSE PRACTITIONER

## 2024-01-08 PROCEDURE — 87186 SC STD MICRODIL/AGAR DIL: CPT | Mod: ,,, | Performed by: CLINICAL MEDICAL LABORATORY

## 2024-01-08 PROCEDURE — 3008F BODY MASS INDEX DOCD: CPT | Mod: ,,, | Performed by: NURSE PRACTITIONER

## 2024-01-08 PROCEDURE — 87086 URINE CULTURE/COLONY COUNT: CPT | Mod: ,,, | Performed by: CLINICAL MEDICAL LABORATORY

## 2024-01-08 PROCEDURE — 1160F RVW MEDS BY RX/DR IN RCRD: CPT | Mod: ,,, | Performed by: NURSE PRACTITIONER

## 2024-01-08 PROCEDURE — 81003 URINALYSIS AUTO W/O SCOPE: CPT | Mod: RHCUB | Performed by: NURSE PRACTITIONER

## 2024-01-08 PROCEDURE — 3074F SYST BP LT 130 MM HG: CPT | Mod: ,,, | Performed by: NURSE PRACTITIONER

## 2024-01-08 PROCEDURE — 87077 CULTURE AEROBIC IDENTIFY: CPT | Mod: ,,, | Performed by: CLINICAL MEDICAL LABORATORY

## 2024-01-08 PROCEDURE — 3078F DIAST BP <80 MM HG: CPT | Mod: ,,, | Performed by: NURSE PRACTITIONER

## 2024-01-08 PROCEDURE — 1125F AMNT PAIN NOTED PAIN PRSNT: CPT | Mod: ,,, | Performed by: NURSE PRACTITIONER

## 2024-01-08 PROCEDURE — 1101F PT FALLS ASSESS-DOCD LE1/YR: CPT | Mod: ,,, | Performed by: NURSE PRACTITIONER

## 2024-01-08 RX ORDER — GABAPENTIN 300 MG/1
300 CAPSULE ORAL 3 TIMES DAILY
Qty: 60 CAPSULE | Refills: 1 | Status: SHIPPED | OUTPATIENT
Start: 2024-01-08 | End: 2025-01-07

## 2024-01-08 RX ORDER — ATENOLOL AND CHLORTHALIDONE TABLET 50; 25 MG/1; MG/1
1 TABLET ORAL DAILY
Qty: 90 TABLET | Refills: 1 | Status: SHIPPED | OUTPATIENT
Start: 2024-01-08

## 2024-01-08 RX ORDER — NITROFURANTOIN 25; 75 MG/1; MG/1
100 CAPSULE ORAL 2 TIMES DAILY
Qty: 14 CAPSULE | Refills: 0 | Status: SHIPPED | OUTPATIENT
Start: 2024-01-08

## 2024-01-08 RX ORDER — POTASSIUM CHLORIDE 750 MG/1
10 TABLET, EXTENDED RELEASE ORAL DAILY
Qty: 90 TABLET | Refills: 1 | Status: CANCELLED | OUTPATIENT
Start: 2024-01-08

## 2024-01-08 RX ORDER — DULAGLUTIDE 1.5 MG/.5ML
INJECTION, SOLUTION SUBCUTANEOUS
Qty: 4 PEN | Refills: 5 | Status: SHIPPED | OUTPATIENT
Start: 2024-01-08

## 2024-01-08 RX ORDER — GABAPENTIN 300 MG/1
300 CAPSULE ORAL DAILY
Qty: 90 CAPSULE | Refills: 1 | Status: SHIPPED | OUTPATIENT
Start: 2024-01-08 | End: 2024-01-08

## 2024-01-08 RX ORDER — CETIRIZINE HYDROCHLORIDE 10 MG/1
10 TABLET ORAL DAILY
Qty: 90 TABLET | Refills: 3 | Status: SHIPPED | OUTPATIENT
Start: 2024-01-08

## 2024-01-08 RX ORDER — PHENAZOPYRIDINE HYDROCHLORIDE 200 MG/1
200 TABLET, FILM COATED ORAL 3 TIMES DAILY
Qty: 30 TABLET | Refills: 0 | Status: SHIPPED | OUTPATIENT
Start: 2024-01-08

## 2024-01-08 RX ORDER — BACLOFEN 10 MG/1
10 TABLET ORAL 2 TIMES DAILY PRN
Qty: 60 TABLET | Refills: 11 | Status: SHIPPED | OUTPATIENT
Start: 2024-01-08 | End: 2025-01-07

## 2024-01-08 RX ORDER — LEVOTHYROXINE SODIUM 75 UG/1
TABLET ORAL
Qty: 90 TABLET | Refills: 1 | Status: SHIPPED | OUTPATIENT
Start: 2024-01-08

## 2024-01-08 RX ORDER — SERTRALINE HYDROCHLORIDE 100 MG/1
100 TABLET, FILM COATED ORAL DAILY
Qty: 90 TABLET | Refills: 1 | Status: CANCELLED | OUTPATIENT
Start: 2024-01-08 | End: 2025-01-07

## 2024-01-08 NOTE — PROGRESS NOTES
Urmila Baldwin NP   [facility]  [address]  [place]  [address]      PATIENT NAME: Jeanie Diamond  : 1955  DATE: 24  MRN: [id number]      Billing Provider: Urmila Baldwin NP  Level of Service: [place] OFFICE/OUTPT VISIT, EST, LEVL IV, 30-39 MIN  Patient PCP Information       Provider PCP Type    Tyron Montalvo DO General            Reason for Visit / Chief Complaint: Follow-up (Jeanie Diamond 68 year old female present for a 6 month follow up labs and medication refills.), Dysuria (Slight vaginal discharge,but mostly she has noticed an odor like an yeast infection. ), and Knee Pain (Reports knee pain bilaterally and would like a refill on her tramadol)         History of Present Illness / Problem Focused Workflow     68 year old female presents to clinic with complaints of urinary frequency, urgency, and foul odor to urine x 1 week.   She also reports bilat knee pain that has been chronic for her.  States Tramadol helps. She would like a refill on this. Discussed with patient that she is taking Norco daily as prescribed by her pain treatment physician and that we could not prescribe and Tramadol to take with her Norco. Offered Diclofenac, Mobic, other NSAIDs. She states she does not want to take those due to they cause stomach upset. Encouraged her to discuss with Dr Moran at next appt that her pain is unrelieved.         Review of Systems     @Review of Systems   Constitutional:  Negative for activity change, appetite change, fatigue and fever.   HENT:  Negative for nasal congestion, ear pain, rhinorrhea, sinus pressure/congestion and sore throat.    Eyes:  Negative for pain, redness, visual disturbance and eye dryness.   Respiratory:  Negative for cough and shortness of breath.    Cardiovascular:  Negative for chest pain and leg swelling.   Gastrointestinal:  Negative for abdominal distention, abdominal pain, constipation and diarrhea.   Endocrine: Negative for cold  intolerance, heat intolerance and polyuria.   Genitourinary:  Positive for dysuria, frequency and urgency. Negative for bladder incontinence.   Musculoskeletal:  Positive for arthralgias. Negative for gait problem and myalgias.   Integumentary:  Negative for color change, rash and wound.   Allergic/Immunologic: Negative for environmental allergies and food allergies.   Neurological:  Negative for dizziness, weakness, light-headedness and headaches.   Psychiatric/Behavioral:  Negative for behavioral problems and sleep disturbance.        Medical / Social / Family History     Past Medical History:   Diagnosis Date    Depressive disorder     Essential hypertension     Hypothyroidism     Low back pain     Obesity     Other chronic pain     Pain in left knee     Personal history of colonic polyps     Unilateral primary osteoarthritis, left knee        Past Surgical History:   Procedure Laterality Date    BILATERAL OOPHORECTOMY      COLONOSCOPY  04/28/2015    HYSTERECTOMY  1996    ROTATOR CUFF REPAIR Right     x 2    TOTAL KNEE ARTHROPLASTY Left 07/2021    Dr. Irving Benitez       Medications and Allergies     Medications  Outpatient Medications Marked as Taking for the 1/8/24 encounter (Office Visit) with Urmila Baldwin NP   Medication Sig Dispense Refill    clobetasol 0.05% (TEMOVATE) 0.05 % Oint Apply topically 2 (two) times daily. 60 g 5    dexAMETHasone (DECADRON) 0.1 % ophthalmic solution Place 1 drop into both eyes.      diclofenac sodium (VOLTAREN) 1 % Gel Apply 2 g topically 3 (three) times daily. 450 g 1    fluticasone propionate (FLONASE) 50 mcg/actuation nasal spray 1 spray (50 mcg total) by Each Nostril route 2 (two) times daily. 15 mL 2    potassium chloride (KLOR-CON) 10 MEQ TbSR Take 1 tablet (10 mEq total) by mouth once daily. 90 tablet 1    sertraline (ZOLOFT) 100 MG tablet Take 1 tablet (100 mg total) by mouth once daily. 90 tablet 1    tiZANidine (ZANAFLEX) 4 MG tablet Take 1 tablet (4 mg total) by  mouth 3 (three) times daily. 90 tablet 5    [DISCONTINUED] amoxicillin (AMOXIL) 875 MG tablet Take 875 mg by mouth 2 (two) times daily.      [DISCONTINUED] atenoloL-chlorthalidone (TENORETIC) 50-25 mg Tab Take 1 tablet by mouth once daily. 90 tablet 1    [DISCONTINUED] cetirizine (ZYRTEC) 10 MG tablet Take 1 tablet (10 mg total) by mouth once daily. 90 tablet 3    [DISCONTINUED] clindamycin (CLEOCIN) 300 MG capsule Take 300 mg by mouth 3 (three) times daily.      [DISCONTINUED] gabapentin (NEURONTIN) 300 MG capsule Take 1 capsule (300 mg total) by mouth once daily. 90 capsule 1    [DISCONTINUED] ibuprofen (ADVIL,MOTRIN) 800 MG tablet Take 1 tablet (800 mg total) by mouth 3 (three) times daily. 90 tablet 5       Allergies  Review of patient's allergies indicates:   Allergen Reactions    Macrolide antibiotics        Physical Examination     Vitals:    01/08/24 1123   BP: 122/74   Resp: 17   Temp: 97.9 °F (36.6 °C)     Physical Exam  Vitals and nursing note reviewed.   Constitutional:       Appearance: She is obese.   HENT:      Head: Normocephalic.      Right Ear: Tympanic membrane normal.      Left Ear: Tympanic membrane normal.      Nose: Nose normal.      Mouth/Throat:      Mouth: Mucous membranes are moist.      Pharynx: Oropharynx is clear. No posterior oropharyngeal erythema.   Eyes:      Conjunctiva/sclera: Conjunctivae normal.   Cardiovascular:      Rate and Rhythm: Normal rate and regular rhythm.      Pulses: Normal pulses.      Heart sounds: Normal heart sounds.   Pulmonary:      Effort: Pulmonary effort is normal.      Breath sounds: Normal breath sounds.   Abdominal:      General: Abdomen is flat. Bowel sounds are normal. There is no distension.      Palpations: Abdomen is soft.      Tenderness: There is abdominal tenderness in the suprapubic area.   Musculoskeletal:         General: No swelling or tenderness. Normal range of motion.      Cervical back: Normal range of motion.      Right knee: Bony  tenderness present.      Left knee: Bony tenderness present.      Right lower leg: No edema.      Left lower leg: No edema.   Skin:     General: Skin is warm and dry.      Capillary Refill: Capillary refill takes less than 2 seconds.   Neurological:      Mental Status: She is alert. Mental status is at baseline.   Psychiatric:         Mood and Affect: Mood normal.         Behavior: Behavior normal.               Lab Results   Component Value Date    WBC 6.67 03/31/2023    HGB 13.5 03/31/2023    HCT 41.9 03/31/2023    MCV 90.3 03/31/2023     03/31/2023        CMP  Sodium   Date Value Ref Range Status   03/31/2023 139 136 - 145 mmol/L Final     Potassium   Date Value Ref Range Status   03/31/2023 3.6 3.5 - 5.1 mmol/L Final     Chloride   Date Value Ref Range Status   03/31/2023 102 98 - 107 mmol/L Final     CO2   Date Value Ref Range Status   03/31/2023 34 (H) 21 - 32 mmol/L Final     Glucose   Date Value Ref Range Status   03/31/2023 87 74 - 106 mg/dL Final     BUN   Date Value Ref Range Status   03/31/2023 9 7 - 18 mg/dL Final     Creatinine   Date Value Ref Range Status   03/31/2023 1.02 0.55 - 1.02 mg/dL Final     Calcium   Date Value Ref Range Status   03/31/2023 9.2 8.5 - 10.1 mg/dL Final     Total Protein   Date Value Ref Range Status   03/31/2023 7.6 6.4 - 8.2 g/dL Final     Albumin   Date Value Ref Range Status   03/31/2023 3.5 3.5 - 5.0 g/dL Final     Bilirubin, Total   Date Value Ref Range Status   03/31/2023 0.7 >0.0 - 1.2 mg/dL Final     Alk Phos   Date Value Ref Range Status   03/31/2023 82 55 - 142 U/L Final     AST   Date Value Ref Range Status   03/31/2023 17 15 - 37 U/L Final     ALT   Date Value Ref Range Status   03/31/2023 17 13 - 56 U/L Final     Anion Gap   Date Value Ref Range Status   03/31/2023 7 7 - 16 mmol/L Final     eGFR   Date Value Ref Range Status   03/31/2023 61 >=60 mL/min/1.73m² Final     Procedures   Assessment and Plan (including Health Maintenance)   :    Plan:     Problem  List Items Addressed This Visit          Psychiatric    Depressive disorder    Current Assessment & Plan     Pt currently on sertraline. She feels that her depression is not as controlled as it could be. She states she is seeing Alfonso and they are discussing adding new medication. She asked for prescription for Ativan today and I explained to her that she can not take this while taking Opioids (Norco).             ENT    Sinusitis    Relevant Medications    cetirizine (ZYRTEC) 10 MG tablet       Cardiac/Vascular    Essential hypertension - Primary    Current Assessment & Plan     BP appears to be well-controlled. Pt denies any HA, CP, SOB palpitations, and lightheadedness. No change in management at this time.          Relevant Medications    atenoloL-chlorthalidone (TENORETIC) 50-25 mg Tab       Renal/    Dysuria    Current Assessment & Plan     UA showed trace blood. Due to symptoms will treat as UTI with Macrobid and Pyridium as needed for burning. Culture obtained and will follow up with results and make changes as needed. Increase fluids.   She has already been referred to Urology but has not seen Dr Lindsey yet. Contacted referral team and they were unable to give me date of appt. Contacted Dr Lindsey office with no answer. Left message. Gave patient number to Dr Lindsey office to follow up.          Relevant Medications    nitrofurantoin, macrocrystal-monohydrate, (MACROBID) 100 MG capsule    Other Relevant Orders    POCT URINALYSIS W/O SCOPE (Completed)    Urine culture       Endocrine    Hypothyroidism    Relevant Medications    levothyroxine (SYNTHROID) 75 MCG tablet    Obesity    Relevant Medications    TRULICITY 1.5 mg/0.5 mL pen injector       Orthopedic    Back pain       Health Maintenance Topics with due status: Not Due       Topic Last Completion Date    Hemoglobin A1c (Prediabetes) 03/31/2023    Lipid Panel 03/31/2023    Colorectal Cancer Screening 12/12/2023       Future Appointments   Date Time  Provider Department Center   3/6/2024 10:20 AM Higinio Miller FNP [facility] FAMMED [place]   5/28/2024  9:00 AM Mayra Vargas MD [facility] DERM [place]   1/2/2025  9:00 AM AWV NURSE [facility]        Health Maintenance Due   Topic Date Due    Hepatitis C Screening  Never done    TETANUS VACCINE  Never done    DEXA Scan  Never done    Shingles Vaccine (1 of 2) Never done    COVID-19 Vaccine (4 - 2023-24 season) 09/01/2023    Mammogram  02/17/2024          Signature:  Urmila Baldwin NP  [facility]  [address] [place] [address], [place]  [address]    Date of encounter: 1/8/24

## 2024-01-09 NOTE — ASSESSMENT & PLAN NOTE
Pt currently on sertraline. She feels that her depression is not as controlled as it could be. She states she is seeing Alfonso and they are discussing adding new medication. She asked for prescription for Ativan today and I explained to her that she can not take this while taking Opioids (Norco).

## 2024-01-09 NOTE — ASSESSMENT & PLAN NOTE
UA showed trace blood. Due to symptoms will treat as UTI with Macrobid and Pyridium as needed for burning. Culture obtained and will follow up with results and make changes as needed. Increase fluids.   She has already been referred to Urology but has not seen Dr Lindsey yet. Contacted referral team and they were unable to give me date of appt. Contacted Dr Lindsey office with no answer. Left message. Gave patient number to Dr Lindsey office to follow up.

## 2024-01-09 NOTE — ASSESSMENT & PLAN NOTE
Severe obesity which complicates all other problems. Recommend extensive weight reduction program to include diet of 1500 calories exercising 5 days per week if possible and diet counseling.

## 2024-01-10 LAB — UA COMPLETE W REFLEX CULTURE PNL UR: ABNORMAL

## 2024-01-10 NOTE — PROGRESS NOTES
Labs reviewed: Urine culture showed E coli sensitive to antibiotic which patient is taking. Please contact patient, notify of findings, and instruct to take all antibiotics until complete. Instruct on good hygiene measures- always wipe from front to back as this a bacteria commonly found in the GI tract. Thanks

## 2024-01-10 NOTE — PROGRESS NOTES
I have attempted without success to contact this patient by phone to discuss lab results. Voice mail was left for return call to clinic

## 2024-01-15 ENCOUNTER — HOSPITAL ENCOUNTER (EMERGENCY)
Facility: HOSPITAL | Age: 69
Discharge: HOME | End: 2024-01-15
Payer: MEDICARE

## 2024-01-15 VITALS
HEART RATE: 66 BPM | DIASTOLIC BLOOD PRESSURE: 64 MMHG | WEIGHT: 290 LBS | TEMPERATURE: 98 F | OXYGEN SATURATION: 96 % | BODY MASS INDEX: 46.61 KG/M2 | RESPIRATION RATE: 16 BRPM | SYSTOLIC BLOOD PRESSURE: 136 MMHG | HEIGHT: 66 IN

## 2024-01-15 DIAGNOSIS — R07.9 CHEST PAIN: ICD-10-CM

## 2024-01-15 DIAGNOSIS — R07.9 CHEST PAIN, UNSPECIFIED TYPE: Primary | ICD-10-CM

## 2024-01-15 LAB
ALBUMIN SERPL BCP-MCNC: 3.3 G/DL (ref 3.5–5)
ALBUMIN/GLOB SERPL: 0.7 {RATIO}
ALP SERPL-CCNC: 84 U/L (ref 55–142)
ALT SERPL W P-5'-P-CCNC: 17 U/L (ref 13–56)
ANION GAP SERPL CALCULATED.3IONS-SCNC: 15 MMOL/L (ref 7–16)
AST SERPL W P-5'-P-CCNC: 11 U/L (ref 15–37)
BASOPHILS # BLD AUTO: 0.05 K/UL (ref 0–0.2)
BASOPHILS NFR BLD AUTO: 0.6 % (ref 0–1)
BASOPHILS NFR BLD MANUAL: 1 % (ref 0–1)
BILIRUB SERPL-MCNC: 0.5 MG/DL (ref ?–1.2)
BUN SERPL-MCNC: 11 MG/DL (ref 7–18)
BUN/CREAT SERPL: 9 (ref 6–20)
CALCIUM SERPL-MCNC: 8.9 MG/DL (ref 8.5–10.1)
CHLORIDE SERPL-SCNC: 100 MMOL/L (ref 98–107)
CO2 SERPL-SCNC: 28 MMOL/L (ref 21–32)
CREAT SERPL-MCNC: 1.18 MG/DL (ref 0.55–1.02)
DIFFERENTIAL METHOD BLD: ABNORMAL
EGFR (NO RACE VARIABLE) (RUSH/TITUS): 51 ML/MIN/1.73M2
EOSINOPHIL # BLD AUTO: 1.49 K/UL (ref 0–0.5)
EOSINOPHIL NFR BLD AUTO: 17 % (ref 1–4)
EOSINOPHIL NFR BLD MANUAL: 18 % (ref 1–4)
ERYTHROCYTE [DISTWIDTH] IN BLOOD BY AUTOMATED COUNT: 14.6 % (ref 11.5–14.5)
GLOBULIN SER-MCNC: 4.7 G/DL (ref 2–4)
GLUCOSE SERPL-MCNC: 134 MG/DL (ref 74–106)
HCT VFR BLD AUTO: 40.8 % (ref 38–47)
HGB BLD-MCNC: 13.4 G/DL (ref 12–16)
LYMPHOCYTES # BLD AUTO: 2.78 K/UL (ref 1–4.8)
LYMPHOCYTES NFR BLD AUTO: 31.6 % (ref 27–41)
LYMPHOCYTES NFR BLD MANUAL: 32 % (ref 27–41)
MAGNESIUM SERPL-MCNC: 2.1 MG/DL (ref 1.7–2.3)
MCH RBC QN AUTO: 29.8 PG (ref 27–31)
MCHC RBC AUTO-ENTMCNC: 32.8 G/DL (ref 32–36)
MCV RBC AUTO: 90.9 FL (ref 80–96)
MONOCYTES # BLD AUTO: 0.42 K/UL (ref 0–0.8)
MONOCYTES NFR BLD AUTO: 4.8 % (ref 2–6)
MONOCYTES NFR BLD MANUAL: 6 % (ref 2–6)
MPC BLD CALC-MCNC: 9.7 FL (ref 9.4–12.4)
NEUTROPHILS # BLD AUTO: 4.05 K/UL (ref 1.8–7.7)
NEUTROPHILS NFR BLD AUTO: 46 % (ref 53–65)
NEUTS SEG NFR BLD MANUAL: 43 % (ref 50–62)
NRBC BLD MANUAL-RTO: ABNORMAL %
PLATELET # BLD AUTO: 378 K/UL (ref 150–400)
POTASSIUM SERPL-SCNC: 3 MMOL/L (ref 3.5–5.1)
PROT SERPL-MCNC: 8 G/DL (ref 6.4–8.2)
RBC # BLD AUTO: 4.49 M/UL (ref 4.2–5.4)
SODIUM SERPL-SCNC: 140 MMOL/L (ref 136–145)
TROPONIN I SERPL DL<=0.01 NG/ML-MCNC: 7.7 PG/ML
WBC # BLD AUTO: 8.79 K/UL (ref 4.5–11)

## 2024-01-15 PROCEDURE — 93010 ELECTROCARDIOGRAM REPORT: CPT | Mod: ,,, | Performed by: STUDENT IN AN ORGANIZED HEALTH CARE EDUCATION/TRAINING PROGRAM

## 2024-01-15 PROCEDURE — 80053 COMPREHEN METABOLIC PANEL: CPT | Performed by: REGISTERED NURSE

## 2024-01-15 PROCEDURE — 99285 EMERGENCY DEPT VISIT HI MDM: CPT | Mod: 25

## 2024-01-15 PROCEDURE — 85025 COMPLETE CBC W/AUTO DIFF WBC: CPT | Performed by: REGISTERED NURSE

## 2024-01-15 PROCEDURE — 25000003 PHARM REV CODE 250: Performed by: REGISTERED NURSE

## 2024-01-15 PROCEDURE — 83735 ASSAY OF MAGNESIUM: CPT | Performed by: REGISTERED NURSE

## 2024-01-15 PROCEDURE — 99284 EMERGENCY DEPT VISIT MOD MDM: CPT | Mod: ,,, | Performed by: REGISTERED NURSE

## 2024-01-15 PROCEDURE — 93005 ELECTROCARDIOGRAM TRACING: CPT

## 2024-01-15 PROCEDURE — 84484 ASSAY OF TROPONIN QUANT: CPT | Performed by: REGISTERED NURSE

## 2024-01-15 RX ORDER — POTASSIUM CHLORIDE 20 MEQ/1
40 TABLET, EXTENDED RELEASE ORAL
Status: COMPLETED | OUTPATIENT
Start: 2024-01-15 | End: 2024-01-15

## 2024-01-15 RX ADMIN — POTASSIUM CHLORIDE 40 MEQ: 1500 TABLET, EXTENDED RELEASE ORAL at 12:01

## 2024-01-15 NOTE — DISCHARGE INSTRUCTIONS
Follow up for any new or worsening problems or otherwise as needed. You need to contact your primary care doctor regarding your antibiotics and Dr. Baig' office for follow up.

## 2024-01-15 NOTE — ED PROVIDER NOTES
Encounter Date: 1/15/2024       History     Chief Complaint   Patient presents with    Chest Pain     68 y-old AAF received to ED with complaint of chest pain that has been ongoing since 24. States that her pain is intermittent in nature. Currently has no pain. No dyspnea. HX of heart disease that she states is followed by Dr. Downs at [facility]. States that she feels that this may be related to the antibiotic that she was written for her UTI several days PTA.      Review of patient's allergies indicates:   Allergen Reactions    Macrolide antibiotics      Past Medical History:   Diagnosis Date    Depressive disorder     Essential hypertension     Hypothyroidism     Low back pain     Obesity     Other chronic pain     Pain in left knee     Personal history of colonic polyps     Unilateral primary osteoarthritis, left knee      Past Surgical History:   Procedure Laterality Date    BILATERAL OOPHORECTOMY      COLONOSCOPY  2015    HYSTERECTOMY  1996    ROTATOR CUFF REPAIR Right     x 2    TOTAL KNEE ARTHROPLASTY Left 2021    Dr. Irving Benitez     Family History   Problem Relation Age of Onset    Diabetes Sister      Social History     Tobacco Use    Smoking status: Former     Current packs/day: 0.00     Types: Cigarettes     Start date:      Quit date: 2021     Years since quittin.7     Passive exposure: Past    Smokeless tobacco: Never   Substance Use Topics    Alcohol use: Yes     Comment: occasionally a glass of wine     Drug use: Never     Review of Systems   Constitutional: Negative.    HENT: Negative.     Eyes: Negative.    Respiratory: Negative.     Cardiovascular:  Positive for chest pain.   Gastrointestinal: Negative.    Endocrine: Negative.    Genitourinary: Negative.    Musculoskeletal: Negative.    Allergic/Immunologic: Negative.    Neurological: Negative.    Hematological: Negative.    Psychiatric/Behavioral: Negative.         Physical Exam     Initial Vitals [01/15/24 1140]   BP Pulse  Resp Temp SpO2   (!) 139/57 64 14 98.2 °F (36.8 °C) 96 %      MAP       --         Physical Exam    Nursing note and vitals reviewed.  Constitutional: She appears well-developed and well-nourished.   HENT:   Head: Normocephalic and atraumatic.   Right Ear: External ear normal.   Left Ear: External ear normal.   Nose: Nose normal.   Mouth/Throat: Oropharynx is clear and moist.   Eyes: Conjunctivae are normal.   Neck: Neck supple.   Normal range of motion.  Cardiovascular:  Normal rate, regular rhythm, normal heart sounds and intact distal pulses.           Pulmonary/Chest: Breath sounds normal.   Abdominal: Abdomen is soft. Bowel sounds are normal.   Musculoskeletal:         General: Normal range of motion.      Cervical back: Normal range of motion and neck supple.     Neurological: She is alert and oriented to person, place, and time. She has normal strength. GCS score is 15. GCS eye subscore is 4. GCS verbal subscore is 5. GCS motor subscore is 6.   Skin: Skin is warm and dry. Capillary refill takes less than 2 seconds.   Psychiatric: She has a normal mood and affect. Her behavior is normal. Judgment and thought content normal.         Medical Screening Exam   See Full Note    ED Course   Procedures  Labs Reviewed   COMPREHENSIVE METABOLIC PANEL - Abnormal; Notable for the following components:       Result Value    Potassium 3.0 (*)     Glucose 134 (*)     Creatinine 1.18 (*)     Albumin 3.3 (*)     Globulin 4.7 (*)     AST 11 (*)     eGFR 51 (*)     All other components within normal limits   CBC WITH DIFFERENTIAL - Abnormal; Notable for the following components:    RDW 14.6 (*)     Neutrophils % 46.0 (*)     Eosinophils % 17.0 (*)     Eosinophils, Absolute 1.49 (*)     All other components within normal limits   MANUAL DIFFERENTIAL - Abnormal; Notable for the following components:    Segmented Neutrophils, Man % 43 (*)     Eosinophils, Man % 18 (*)     All other components within normal limits   TROPONIN I -  Normal   MAGNESIUM - Normal   CBC W/ AUTO DIFFERENTIAL    Narrative:     The following orders were created for panel order CBC auto differential.  Procedure                               Abnormality         Status                     ---------                               -----------         ------                     CBC with Differential[[id number]]       Abnormal            Final result               Manual Differential[[id number]]         Abnormal            Final result                 Please view results for these tests on the individual orders.          Imaging Results              X-Ray Chest AP Portable (Final result)  Result time 01/15/24 12:03:44      Final result by Manjit Mg DO (01/15/24 12:03:44)                   Impression:      No acute cardiopulmonary process demonstrated.    Point of Service: [facility]      Electronically signed by: Manjit Mg  Date:    01/15/2024  Time:    12:03               Narrative:    EXAMINATION:  XR CHEST AP PORTABLE    CLINICAL HISTORY:  Chest pain, unspecified    COMPARISON:  Chest x-ray May 15, 2019    TECHNIQUE:  Frontal view/views of the chest.    FINDINGS:  The cardiomediastinal silhouette is stable in configuration.  Chronic change of the lungs without focal consolidation, pleural effusion, or pneumothorax.  Visualized osseous and surrounding soft tissue structures appear grossly unchanged.                                       Medications   potassium chloride SA CR tablet 40 mEq (has no administration in time range)     Medical Decision Making  68 y-old AAF received to ED with complaint of chest pain that has been ongoing since 01/13/24. States that her pain is intermittent in nature. Currently has no pain. No dyspnea. HX of heart disease that she states is followed by Dr. Downs at [facility]. States that she feels that this may be related to the antibiotic that she was written for her UTI several days PTA.      Amount and/or Complexity of Data  Reviewed  Labs: ordered.     Details: CBC WNL, K+ 3.0, BUN/Creat 11/1.18, Troponin 7.7  Radiology: ordered.     Details: No acute cardiopulmonary process demonstrated.         Risk  Risk Details: Patient states that she noticed her symptoms after starting her ABX for her UTI so she stopped taking her ABX yesterday. States that her pain started in her stomach after taking her ABX. Denies any pain while in the ED. Recommend that she f/u with her PCP in the AM to address her ABX as she did have positive urine CX and she decided to not finish her ABX. Also recommend that she call her cardiologists office Dr. Downs in the AM to schedule a follow up appointment. Patient verbalizes understanding.                                       Clinical Impression:   Final diagnoses:  [R07.9] Chest pain               Lauro Li, [place]  01/15/24 1243

## 2024-01-15 NOTE — ED TRIAGE NOTES
Presents with chest pain on and off since Saturday.  [facility] to [facility] yesterdayand was told if it didn't get better to come to the Ed.  Saw Dr. Wilson about 6 months ago and everything was fine.

## 2024-03-06 ENCOUNTER — OFFICE VISIT (OUTPATIENT)
Dept: FAMILY MEDICINE | Facility: CLINIC | Age: 69
End: 2024-03-06
Payer: MEDICARE

## 2024-03-06 VITALS
HEIGHT: 66 IN | OXYGEN SATURATION: 99 % | WEIGHT: 291.19 LBS | HEART RATE: 57 BPM | TEMPERATURE: 99 F | RESPIRATION RATE: 19 BRPM | BODY MASS INDEX: 46.8 KG/M2 | SYSTOLIC BLOOD PRESSURE: 132 MMHG | DIASTOLIC BLOOD PRESSURE: 78 MMHG

## 2024-03-06 DIAGNOSIS — R05.1 ACUTE COUGH: Primary | ICD-10-CM

## 2024-03-06 DIAGNOSIS — J32.9 SINUSITIS, UNSPECIFIED CHRONICITY, UNSPECIFIED LOCATION: ICD-10-CM

## 2024-03-06 DIAGNOSIS — R09.81 NASAL CONGESTION: ICD-10-CM

## 2024-03-06 LAB
CTP QC/QA: YES
MOLECULAR STREP A: NEGATIVE
POC MOLECULAR INFLUENZA A AGN: NEGATIVE
POC MOLECULAR INFLUENZA B AGN: NEGATIVE
SARS-COV-2 RDRP RESP QL NAA+PROBE: NEGATIVE

## 2024-03-06 PROCEDURE — 96372 THER/PROPH/DIAG INJ SC/IM: CPT | Mod: ,,, | Performed by: FAMILY MEDICINE

## 2024-03-06 PROCEDURE — 1159F MED LIST DOCD IN RCRD: CPT | Mod: ,,, | Performed by: FAMILY MEDICINE

## 2024-03-06 PROCEDURE — 99214 OFFICE O/P EST MOD 30 MIN: CPT | Mod: 25,,, | Performed by: FAMILY MEDICINE

## 2024-03-06 PROCEDURE — 87635 SARS-COV-2 COVID-19 AMP PRB: CPT | Mod: RHCUB | Performed by: FAMILY MEDICINE

## 2024-03-06 PROCEDURE — 3075F SYST BP GE 130 - 139MM HG: CPT | Mod: ,,, | Performed by: FAMILY MEDICINE

## 2024-03-06 PROCEDURE — 1160F RVW MEDS BY RX/DR IN RCRD: CPT | Mod: ,,, | Performed by: FAMILY MEDICINE

## 2024-03-06 PROCEDURE — 1101F PT FALLS ASSESS-DOCD LE1/YR: CPT | Mod: ,,, | Performed by: FAMILY MEDICINE

## 2024-03-06 PROCEDURE — 87651 STREP A DNA AMP PROBE: CPT | Mod: RHCUB | Performed by: FAMILY MEDICINE

## 2024-03-06 PROCEDURE — 3008F BODY MASS INDEX DOCD: CPT | Mod: ,,, | Performed by: FAMILY MEDICINE

## 2024-03-06 PROCEDURE — 87502 INFLUENZA DNA AMP PROBE: CPT | Mod: RHCUB | Performed by: FAMILY MEDICINE

## 2024-03-06 PROCEDURE — 3078F DIAST BP <80 MM HG: CPT | Mod: ,,, | Performed by: FAMILY MEDICINE

## 2024-03-06 PROCEDURE — 3288F FALL RISK ASSESSMENT DOCD: CPT | Mod: ,,, | Performed by: FAMILY MEDICINE

## 2024-03-06 RX ORDER — LEVOTHYROXINE SODIUM 150 UG/1
150 TABLET ORAL
COMMUNITY
Start: 2024-03-04

## 2024-03-06 RX ORDER — SPIRONOLACTONE 50 MG/1
50 TABLET, FILM COATED ORAL DAILY
COMMUNITY
Start: 2024-03-04

## 2024-03-06 RX ORDER — ATENOLOL 50 MG/1
50 TABLET ORAL DAILY
COMMUNITY
Start: 2024-03-04

## 2024-03-06 RX ORDER — AMOXICILLIN AND CLAVULANATE POTASSIUM 875; 125 MG/1; MG/1
1 TABLET, FILM COATED ORAL 2 TIMES DAILY
Qty: 14 TABLET | Refills: 0 | Status: SHIPPED | OUTPATIENT
Start: 2024-03-06 | End: 2024-03-13

## 2024-03-06 RX ORDER — BENZONATATE 100 MG/1
200 CAPSULE ORAL 3 TIMES DAILY PRN
Qty: 60 CAPSULE | Refills: 0 | Status: SHIPPED | OUTPATIENT
Start: 2024-03-06 | End: 2024-03-16

## 2024-03-06 RX ORDER — CEFTRIAXONE 1 G/1
1 INJECTION, POWDER, FOR SOLUTION INTRAMUSCULAR; INTRAVENOUS
Status: COMPLETED | OUTPATIENT
Start: 2024-03-06 | End: 2024-03-06

## 2024-03-06 RX ORDER — METHYLPREDNISOLONE ACETATE 40 MG/ML
40 INJECTION, SUSPENSION INTRA-ARTICULAR; INTRALESIONAL; INTRAMUSCULAR; SOFT TISSUE
Status: COMPLETED | OUTPATIENT
Start: 2024-03-06 | End: 2024-03-06

## 2024-03-06 RX ADMIN — METHYLPREDNISOLONE ACETATE 40 MG: 40 INJECTION, SUSPENSION INTRA-ARTICULAR; INTRALESIONAL; INTRAMUSCULAR; SOFT TISSUE at 12:03

## 2024-03-06 RX ADMIN — CEFTRIAXONE 1 G: 1 INJECTION, POWDER, FOR SOLUTION INTRAMUSCULAR; INTRAVENOUS at 12:03

## 2024-03-06 NOTE — PROGRESS NOTES
Tyron Montalvo DO   [facility]  [address] [address] [address]  [phone]      PATIENT NAME: Jeanie Diamond  : 1955  DATE: 3/6/24  MRN: [id number]      Billing Provider: Tyron Montalvo DO  Level of Service:   Patient PCP Information       Provider PCP Type    Tyron Montalvo DO General            Reason for Visit / Chief Complaint: Cough (Patient reports she has developed a wet productive cough since Thursday. ), Nasal Congestion (Patient reports ongoing nasal congestion and drainage since Thursday. ), and Headache (Patient reports sinus head cold ongoing over the weekend, around eyes and nose. )       Update PCP  Update Chief Complaint         History of Present Illness / Problem Focused Workflow     Jeanie Diamond presents to the clinic with Cough (Patient reports she has developed a wet productive cough since Thursday. ), Nasal Congestion (Patient reports ongoing nasal congestion and drainage since Thursday. ), and Headache (Patient reports sinus head cold ongoing over the weekend, around eyes and nose. )     Cough  Associated symptoms include headaches. Pertinent negatives include no chest pain, chills, fever, rash, sore throat or shortness of breath.   Headache   Associated symptoms include coughing. Pertinent negatives include no abdominal pain, dizziness, fever, nausea, sore throat or vomiting.       Review of Systems     Review of Systems   Constitutional:  Negative for chills, diaphoresis and fever.   HENT:  Negative for congestion and sore throat.    Respiratory:  Positive for cough. Negative for shortness of breath.    Cardiovascular:  Negative for chest pain and palpitations.   Gastrointestinal:  Negative for abdominal pain, constipation, diarrhea, nausea and vomiting.   Genitourinary:  Negative for dysuria and hematuria.   Skin:  Negative for rash.   Neurological:  Positive for headaches. Negative for dizziness and light-headedness.        Medical / Social / Family History     Past Medical History:   Diagnosis Date    Depressive disorder     Essential hypertension     Hypothyroidism     Low back pain     Obesity     Other chronic pain     Pain in left knee     Personal history of colonic polyps     Unilateral primary osteoarthritis, left knee        Past Surgical History:   Procedure Laterality Date    BILATERAL OOPHORECTOMY      COLONOSCOPY  04/28/2015    HYSTERECTOMY  1996    ROTATOR CUFF REPAIR Right     x 2    TOTAL KNEE ARTHROPLASTY Left 07/2021    Dr. Irving Benitez       Social History  Ms. Diamond  reports that she quit smoking about 2 years ago. Her smoking use included cigarettes. She started smoking about 37 years ago. She has been exposed to tobacco smoke. She has never used smokeless tobacco. She reports current alcohol use. She reports that she does not use drugs.    Family History  Ms.'s Diamond family history includes Diabetes in her sister.    Medications and Allergies     Medications  Outpatient Medications Marked as Taking for the 3/6/24 encounter (Office Visit) with Tyron Montalvo, DO   Medication Sig Dispense Refill    atenoloL (TENORMIN) 50 MG tablet Take 50 mg by mouth once daily.      baclofen (LIORESAL) 10 MG tablet Take 1 tablet (10 mg total) by mouth 2 (two) times daily as needed (muscle spasm). 60 tablet 11    gabapentin (NEURONTIN) 300 MG capsule Take 1 capsule (300 mg total) by mouth 3 (three) times daily. 60 capsule 1    levothyroxine (SYNTHROID) 150 MCG tablet Take 150 mcg by mouth before breakfast.      potassium chloride (KLOR-CON) 10 MEQ TbSR Take 1 tablet (10 mEq total) by mouth once daily. 90 tablet 1    sertraline (ZOLOFT) 100 MG tablet Take 1 tablet (100 mg total) by mouth once daily. 90 tablet 1    spironolactone (ALDACTONE) 50 MG tablet Take 50 mg by mouth once daily.       Current Facility-Administered Medications for the 3/6/24 encounter (Office Visit) with Tyron Montalvo DO   Medication Dose Route  Frequency Provider Last Rate Last Admin    [COMPLETED] cefTRIAXone injection 1 g  1 g Intramuscular 1 time in Clinic/HOD Tyron Montalvo DO   1 g at 03/06/24 1207    [COMPLETED] methylPREDNISolone acetate injection 40 mg  40 mg Intramuscular 1 time in Clinic/HOD Tyron Montalvo DO   40 mg at 03/06/24 1209       Allergies  Review of patient's allergies indicates:   Allergen Reactions    Macrolide antibiotics        Physical Examination     Vitals:    03/06/24 1029   BP: 132/78   Pulse: (!) 57   Resp: 19   Temp: 98.6 °F (37 °C)     Physical Exam  Vitals and nursing note reviewed.   Constitutional:       General: She is not in acute distress.     Appearance: She is obese. She is not ill-appearing, toxic-appearing or diaphoretic.   HENT:      Head: Normocephalic and atraumatic.      Right Ear: External ear normal.      Left Ear: External ear normal.      Mouth/Throat:      Mouth: Mucous membranes are moist.      Pharynx: No oropharyngeal exudate or posterior oropharyngeal erythema.   Eyes:      General: No scleral icterus.        Right eye: No discharge.         Left eye: No discharge.      Extraocular Movements: Extraocular movements intact.      Conjunctiva/sclera: Conjunctivae normal.      Pupils: Pupils are equal, round, and reactive to light.   Neck:      Vascular: No carotid bruit.   Cardiovascular:      Rate and Rhythm: Normal rate and regular rhythm.      Heart sounds: No murmur heard.     No friction rub. No gallop.   Pulmonary:      Effort: No respiratory distress.      Breath sounds: No stridor. No wheezing, rhonchi or rales.      Comments: No adventitious lung sounds appreciated.  Lungs clear to auscultation bilaterally.  No wheezes, rales or rhonchi.  Abdominal:      General: Abdomen is flat.      Tenderness: There is no abdominal tenderness. There is no guarding.   Musculoskeletal:         General: No swelling.      Right lower leg: No edema.      Left lower leg: No edema.   Skin:     General: Skin is  warm and dry.      Coloration: Skin is not jaundiced.   Neurological:      Mental Status: She is alert and oriented to person, place, and time.         Assessment and Plan (including Health Maintenance)      Problem List  Smart Sets  Document Outside HM   :    Plan:         Health Maintenance Due   Topic Date Due    Hepatitis C Screening  Never done    TETANUS VACCINE  Never done    DEXA Scan  Never done    Shingles Vaccine (1 of 2) Never done    COVID-19 Vaccine (4 - 2023-24 season) 09/01/2023    Mammogram  02/17/2024    Lipid Panel  03/31/2024       Problem List Items Addressed This Visit          ENT    Sinusitis    Relevant Medications    methylPREDNISolone acetate injection 40 mg (Completed)    cefTRIAXone injection 1 g (Completed)    benzonatate (TESSALON) 100 MG capsule    amoxicillin-clavulanate 875-125mg (AUGMENTIN) 875-125 mg per tablet     Other Visit Diagnoses       Acute cough    -  Primary    Relevant Orders    POCT COVID-19 Rapid Screening (Completed)    POCT Strep A, Molecular (Completed)    POCT Influenza A/B Molecular (Completed)    Nasal congestion        Relevant Orders    POCT COVID-19 Rapid Screening (Completed)    POCT Strep A, Molecular (Completed)    POCT Influenza A/B Molecular (Completed)        Strong possibility that patient is suffering from bacterial sinusitis.  Patient given antibiotics and medications for symptomatic relief.  Patient is requesting albuterol.  However, this medication is not on her medication list.  No adventitious lung sounds appreciated today.  Some concern as  her recent labs reveal hypokalemia.  Patient has history of requesting multiple unwarranted medications in the past such as phentermine and benzodiazepines.  Patient is also undergoing cardiovascular evaluation for possible ischemia and albuterol may be hazardous as it can increase heart rate.  Patient has not used her nebulizer in months.  Patient counseled at length to call, return to clinic or present to  the ED should symptoms persist or worsen.  Follow up as needed.  Patient signals understanding and agreement with the plan of care.  Health Maintenance Topics with due status: Not Due       Topic Last Completion Date    Hemoglobin A1c (Prediabetes) 03/31/2023    Colorectal Cancer Screening 12/12/2023       Future Appointments   Date Time Provider Department Center   5/28/2024  9:00 AM Mayra Vargas MD [facility]   1/2/2025  9:00 AM [facility]            Signature:  Tyron Montalvo, DO  [facility]  [address] [address] [address]  [phone]    Date of encounter: 3/6/24

## 2024-04-22 NOTE — ASSESSMENT & PLAN NOTE
Low back pain with dysuria likely urinary tract infection.  Will start her on Augmentin twice a day for 7 days.  Follow-up on a p.r.n. basis.  Tylenol for pain.   Detail Level: Detailed Add 87074 Cpt? (Important Note: In 2017 The Use Of 10995 Is Being Tracked By Cms To Determine Future Global Period Reimbursement For Global Periods): yes

## 2024-08-06 ENCOUNTER — TELEPHONE (OUTPATIENT)
Dept: FAMILY MEDICINE | Facility: CLINIC | Age: 69
End: 2024-08-06
Payer: MEDICARE

## 2024-09-05 RX ORDER — ATENOLOL 50 MG/1
50 TABLET ORAL DAILY
Qty: 30 TABLET | Refills: 0 | Status: SHIPPED | OUTPATIENT
Start: 2024-09-05

## 2024-09-05 RX ORDER — SPIRONOLACTONE 50 MG/1
50 TABLET, FILM COATED ORAL DAILY
Qty: 30 TABLET | Refills: 0 | Status: SHIPPED | OUTPATIENT
Start: 2024-09-05

## 2025-04-21 ENCOUNTER — OFFICE VISIT (OUTPATIENT)
Dept: FAMILY MEDICINE | Facility: CLINIC | Age: 70
End: 2025-04-21
Payer: MEDICARE

## 2025-04-21 ENCOUNTER — TELEPHONE (OUTPATIENT)
Dept: FAMILY MEDICINE | Facility: CLINIC | Age: 70
End: 2025-04-21
Payer: MEDICARE

## 2025-04-21 ENCOUNTER — APPOINTMENT (OUTPATIENT)
Dept: RADIOLOGY | Facility: CLINIC | Age: 70
End: 2025-04-21
Payer: MEDICARE

## 2025-04-21 ENCOUNTER — RESULTS FOLLOW-UP (OUTPATIENT)
Dept: FAMILY MEDICINE | Facility: CLINIC | Age: 70
End: 2025-04-21

## 2025-04-21 VITALS
DIASTOLIC BLOOD PRESSURE: 80 MMHG | OXYGEN SATURATION: 96 % | TEMPERATURE: 98 F | BODY MASS INDEX: 43.81 KG/M2 | HEIGHT: 66 IN | HEART RATE: 86 BPM | SYSTOLIC BLOOD PRESSURE: 134 MMHG | RESPIRATION RATE: 20 BRPM | WEIGHT: 272.63 LBS

## 2025-04-21 DIAGNOSIS — R05.9 COUGH, UNSPECIFIED TYPE: ICD-10-CM

## 2025-04-21 DIAGNOSIS — J40 BRONCHITIS: Primary | ICD-10-CM

## 2025-04-21 DIAGNOSIS — R07.9 CHEST PAIN, UNSPECIFIED TYPE: ICD-10-CM

## 2025-04-21 PROCEDURE — 3288F FALL RISK ASSESSMENT DOCD: CPT | Mod: ,,,

## 2025-04-21 PROCEDURE — 3075F SYST BP GE 130 - 139MM HG: CPT | Mod: ,,,

## 2025-04-21 PROCEDURE — 1159F MED LIST DOCD IN RCRD: CPT | Mod: ,,,

## 2025-04-21 PROCEDURE — 96372 THER/PROPH/DIAG INJ SC/IM: CPT | Mod: ,,,

## 2025-04-21 PROCEDURE — 71046 X-RAY EXAM CHEST 2 VIEWS: CPT | Mod: TC,RHCUB

## 2025-04-21 PROCEDURE — 1160F RVW MEDS BY RX/DR IN RCRD: CPT | Mod: ,,,

## 2025-04-21 PROCEDURE — 1101F PT FALLS ASSESS-DOCD LE1/YR: CPT | Mod: ,,,

## 2025-04-21 PROCEDURE — 1125F AMNT PAIN NOTED PAIN PRSNT: CPT | Mod: ,,,

## 2025-04-21 PROCEDURE — 99213 OFFICE O/P EST LOW 20 MIN: CPT | Mod: 25,,,

## 2025-04-21 PROCEDURE — 3079F DIAST BP 80-89 MM HG: CPT | Mod: ,,,

## 2025-04-21 PROCEDURE — 4010F ACE/ARB THERAPY RXD/TAKEN: CPT | Mod: ,,,

## 2025-04-21 PROCEDURE — 71046 X-RAY EXAM CHEST 2 VIEWS: CPT | Mod: 26,,, | Performed by: RADIOLOGY

## 2025-04-21 PROCEDURE — 3008F BODY MASS INDEX DOCD: CPT | Mod: ,,,

## 2025-04-21 RX ORDER — ALBUTEROL SULFATE 90 UG/1
1 POWDER, METERED RESPIRATORY (INHALATION) EVERY 4 HOURS PRN
COMMUNITY
Start: 2025-02-05

## 2025-04-21 RX ORDER — AZELASTINE 1 MG/ML
1 SPRAY, METERED NASAL 2 TIMES DAILY
COMMUNITY

## 2025-04-21 RX ORDER — BENZONATATE 100 MG/1
100 CAPSULE ORAL 3 TIMES DAILY PRN
COMMUNITY
Start: 2025-02-05

## 2025-04-21 RX ORDER — DEXCHLORPHENIRAMINE MALEATE, DEXTROMETHORPHAN HBR, PHENYLEPHRINE HCL 1; 10; 5 MG/5ML; MG/5ML; MG/5ML
10 SYRUP ORAL 2 TIMES DAILY PRN
Qty: 480 ML | Refills: 0 | Status: SHIPPED | OUTPATIENT
Start: 2025-04-21

## 2025-04-21 RX ORDER — ALBUTEROL SULFATE 0.83 MG/ML
2.5 SOLUTION RESPIRATORY (INHALATION) DAILY
Qty: 45 ML | Refills: 3 | Status: SHIPPED | OUTPATIENT
Start: 2025-04-21

## 2025-04-21 RX ORDER — CEFTRIAXONE 1 G/1
1 INJECTION, POWDER, FOR SOLUTION INTRAMUSCULAR; INTRAVENOUS
Status: COMPLETED | OUTPATIENT
Start: 2025-04-21 | End: 2025-04-21

## 2025-04-21 RX ORDER — IBUPROFEN 800 MG/1
1 TABLET ORAL 3 TIMES DAILY
COMMUNITY

## 2025-04-21 RX ORDER — IPRATROPIUM BROMIDE 42 UG/1
1 SPRAY, METERED NASAL 2 TIMES DAILY
COMMUNITY

## 2025-04-21 RX ORDER — FUROSEMIDE 40 MG/1
40 TABLET ORAL DAILY
COMMUNITY

## 2025-04-21 RX ORDER — ALBUTEROL SULFATE 0.83 MG/ML
2.5 SOLUTION RESPIRATORY (INHALATION) DAILY
COMMUNITY
Start: 2025-04-18 | End: 2025-04-21 | Stop reason: SDUPTHER

## 2025-04-21 RX ORDER — LISINOPRIL 20 MG/1
10 TABLET ORAL DAILY
COMMUNITY

## 2025-04-21 RX ORDER — ATENOLOL AND CHLORTHALIDONE TABLET 50; 25 MG/1; MG/1
1 TABLET ORAL DAILY
COMMUNITY

## 2025-04-21 RX ORDER — DEXAMETHASONE SODIUM PHOSPHATE 4 MG/ML
4 INJECTION, SOLUTION INTRA-ARTICULAR; INTRALESIONAL; INTRAMUSCULAR; INTRAVENOUS; SOFT TISSUE
Status: COMPLETED | OUTPATIENT
Start: 2025-04-21 | End: 2025-04-21

## 2025-04-21 RX ADMIN — DEXAMETHASONE SODIUM PHOSPHATE 4 MG: 4 INJECTION, SOLUTION INTRA-ARTICULAR; INTRALESIONAL; INTRAMUSCULAR; INTRAVENOUS; SOFT TISSUE at 12:04

## 2025-04-21 RX ADMIN — CEFTRIAXONE 1 G: 1 INJECTION, POWDER, FOR SOLUTION INTRAMUSCULAR; INTRAVENOUS at 12:04

## 2025-04-21 NOTE — PROGRESS NOTES
No pneumonia. No evidence of acute disease. We will continue with current plan we discussed in the clinic.

## 2025-04-21 NOTE — ASSESSMENT & PLAN NOTE
Chest tightness when coughing. She denies any chest pain any other times. Its more of burning when she coughs.   EKG did show what appears to me an old MI with with left axis deviation. Made an appt with Dr. Downs for this Friday morning. Faxed EKG to his office. I think clearing of bronchitis will help this chest tightness. She needs to take all medications as prescribed.

## 2025-04-21 NOTE — PROGRESS NOTES
HPI:   Jeanie Diamond is a pleasant 70 y.o. patient who reports to clinic with complaints of cough it has been going on for about 3 week, She states she went to [facility] 4[facility], dx with bronchitis and gave her amoxicillin (she did not start), albuterol nebulizer solution (only used some)and a rocephin shot.  She states her cough never got better. She did not take her amoxicillin. She does state she has a productive cough, with clear sputum. She states she has chest pain when she coughs only and SOB on exertion.   She states she has only been short of breath since she has been coughing. She states about 4 weeks ago she went to the allergy clinic and had her allergy shot and has felt bad ever since. . She doesn't want to do the allergy shots anymore. She states she doesn't do the breathing treatments like she is suppose to, she states she may only do them once a day. She sees Dr. Downs for her cardiac issues. She states he told her heart looked great. EKG done and it appears an old MI. We made her an appointment with Dr. Downs this Friday and faxed him her EKG to him.                       Past Medical History:   Diagnosis Date    Depressive disorder     Essential hypertension     Hypothyroidism     Low back pain     Obesity     Other chronic pain     Pain in left knee     Personal history of colonic polyps     Unilateral primary osteoarthritis, left knee        PAST SURGICAL HISTORY:   Past Surgical History:   Procedure Laterality Date    BILATERAL OOPHORECTOMY      COLONOSCOPY  04/28/2015    HYSTERECTOMY  1996    ROTATOR CUFF REPAIR Right     x 2    TOTAL KNEE ARTHROPLASTY Left 07/2021    Dr. Irving Benitez       MEDICATIONS:  Current Medications[1]    ALLERGIES:   Review of patient's allergies indicates:   Allergen Reactions    Erythromycin Other (See Comments)    Macrolide antibiotics     Nsaids (non-steroidal anti-inflammatory drug) Other (See Comments)         Review of Systems   Constitutional:  Negative.  Negative for activity change, appetite change and fatigue.   HENT: Negative.  Negative for trouble swallowing.    Eyes: Negative.  Negative for pain and visual disturbance.   Respiratory:  Positive for cough, chest tightness (when coughing) and shortness of breath.    Cardiovascular:  Negative for palpitations.   Gastrointestinal: Negative.  Negative for change in bowel habit.   Endocrine: Negative.    Genitourinary: Negative.  Negative for difficulty urinating and dysuria.   Musculoskeletal: Negative.  Negative for arthralgias and myalgias.   Integumentary:  Negative for rash and wound. Negative.   Allergic/Immunologic: Negative.    Neurological: Negative.  Negative for weakness and headaches.   Hematological: Negative.    Psychiatric/Behavioral: Negative.  The patient is not nervous/anxious.           Physical Exam  Constitutional:       General: She is not in acute distress.     Appearance: Normal appearance. She is well-developed. She is obese. She is not ill-appearing.   HENT:      Head: Normocephalic and atraumatic.      Right Ear: Tympanic membrane normal.      Left Ear: Tympanic membrane normal.      Nose: Nose normal.      Mouth/Throat:      Mouth: Mucous membranes are moist.      Pharynx: Oropharynx is clear. No posterior oropharyngeal erythema.   Cardiovascular:      Rate and Rhythm: Normal rate and regular rhythm.      Pulses: Normal pulses.      Heart sounds: Normal heart sounds.   Pulmonary:      Effort: Pulmonary effort is normal. No accessory muscle usage or respiratory distress.      Breath sounds: Examination of the right-upper field reveals rhonchi. Examination of the left-upper field reveals rhonchi. Rhonchi present.   Abdominal:      General: Abdomen is flat. Bowel sounds are normal. There is no distension.      Palpations: Abdomen is soft.      Tenderness: There is no abdominal tenderness.   Musculoskeletal:         General: Normal range of motion.      Cervical back: Normal range of  "motion.   Skin:     General: Skin is warm and dry.      Capillary Refill: Capillary refill takes less than 2 seconds.   Neurological:      Mental Status: She is alert and oriented to person, place, and time. Mental status is at baseline.   Psychiatric:         Mood and Affect: Mood normal.         Speech: Speech normal.         Behavior: Behavior normal. Behavior is cooperative.         Thought Content: Thought content normal.          VITAL SIGNS:   /80 (BP Location: Right arm, Patient Position: Sitting)   Pulse 86   Temp 98.2 °F (36.8 °C) (Oral)   Resp 20   Ht 5' 6" (1.676 m)   Wt 123.7 kg (272 lb 9.6 oz)   SpO2 96%   BMI 44.00 kg/m²       ASSESSMENT/PLAN  1. Bronchitis  Assessment & Plan:  COVID FLU and Strep neg in [facility]  Increase fluid intake  Start Polytussin as needed for cough  Follow up with the clinic if s/s persist or become worse.   Chest xray is negative  Breathing treatments twice a day  Continue amoxicillin she never started given by [facility].   Continue breathing treatments given, twice a day  Decadron 4mg IM today  Rocephin 1 GM     I explained risk factors of steroid use with the patient which include cardiovascular effects such as Hypertension, dyslipidemia, fluid retention, decreased potassium, sodium retention, a fib. Also discussed other risk such as elevated blood glucose levels, depression, anxiety or agitation, cushing syndrome, gi side effects such as peptic ulcer with possible hemorrhage, abdominal distention. Pt voiced understanding.     Orders:  -     cefTRIAXone injection 1 g  -     dexAMETHasone injection 4 mg  -     albuterol (PROVENTIL) 2.5 mg /3 mL (0.083 %) nebulizer solution; Take 3 mLs (2.5 mg total) by nebulization once daily.  Dispense: 45 mL; Refill: 3    2. Cough, unspecified type  Assessment & Plan:  COVID FLU and Strep neg in [facility]  Increase fluid intake  Start Polytussin as needed for cough  Follow up with the clinic if s/s persist or become worse. "   Chest xray is negative  Breathing treatments twice a day  Continue amoxicillin she never started given by [facility].   Continue breathing treatments given, twice a day  Decadron 4mg IM today    I explained risk factors of steroid use with the patient which include cardiovascular effects such as Hypertension, dyslipidemia, fluid retention, decreased potassium, sodium retention, a fib. Also discussed other risk such as elevated blood glucose levels, depression, anxiety or agitation, cushing syndrome, gi side effects such as peptic ulcer with possible hemorrhage, abdominal distention. Pt voiced understanding.         Orders:  -     X-Ray Chest PA And Lateral; Future; Expected date: 04/21/2025  -     dexchlorphen-phenylephrine-DM (POLYTUSSIN DM,DEXCHLORPHENRMN,) 1-5-10 mg/5 mL Syrp; Take 10 mLs by mouth 2 (two) times daily as needed (cough).  Dispense: 480 mL; Refill: 0  -     albuterol (PROVENTIL) 2.5 mg /3 mL (0.083 %) nebulizer solution; Take 3 mLs (2.5 mg total) by nebulization once daily.  Dispense: 45 mL; Refill: 3    3. Chest pain, unspecified type  Assessment & Plan:  Chest tightness when coughing. She denies any chest pain any other times. Its more of burning when she coughs.   EKG did show what appears to me an old MI with with left axis deviation. Made an appt with Dr. Downs for this Friday morning. Faxed EKG to his office. I think clearing of bronchitis will help this chest tightness. She needs to take all medications as prescribed.     Orders:  -     Cancel: EKG 12-lead; Future  -     Cancel: EKG 12-lead             There are no Patient Instructions on file for this visit.  Orders Placed This Encounter   Procedures    X-Ray Chest PA And Lateral     Standing Status:   Future     Number of Occurrences:   1     Expected Date:   4/21/2025     Expiration Date:   4/21/2026     May the Radiologist modify the order per protocol to meet the clinical needs of the patient?:   Yes     Release to patient:   Immediate                 [1]   Current Outpatient Medications:     benzonatate (TESSALON) 100 MG capsule, Take 100 mg by mouth 3 (three) times daily as needed., Disp: , Rfl:     cetirizine (ZYRTEC) 10 MG tablet, Take 1 tablet (10 mg total) by mouth once daily., Disp: 90 tablet, Rfl: 3    clobetasol 0.05% (TEMOVATE) 0.05 % Oint, Apply topically 2 (two) times daily., Disp: 60 g, Rfl: 5    dexAMETHasone (DECADRON) 0.1 % ophthalmic solution, Place 1 drop into both eyes., Disp: , Rfl:     gabapentin (NEURONTIN) 300 MG capsule, Take 1 capsule (300 mg total) by mouth 3 (three) times daily., Disp: 60 capsule, Rfl: 1    HYDROcodone-acetaminophen (NORCO)  mg per tablet, Pt has cut back to 2 daily until after surgery, Disp: , Rfl:     levothyroxine (SYNTHROID) 150 MCG tablet, Take 150 mcg by mouth before breakfast., Disp: , Rfl:     potassium chloride (KLOR-CON) 10 MEQ TbSR, Take 1 tablet (10 mEq total) by mouth once daily., Disp: 90 tablet, Rfl: 1    PROAIR RESPICLICK 90 mcg/actuation inhaler, Inhale 1 puff into the lungs every 4 (four) hours as needed., Disp: , Rfl:     spironolactone (ALDACTONE) 50 MG tablet, Take 1 tablet (50 mg total) by mouth once daily., Disp: 30 tablet, Rfl: 0    tiZANidine (ZANAFLEX) 4 MG tablet, Take 1 tablet (4 mg total) by mouth 3 (three) times daily., Disp: 90 tablet, Rfl: 5    albuterol (PROVENTIL) 2.5 mg /3 mL (0.083 %) nebulizer solution, Take 3 mLs (2.5 mg total) by nebulization once daily., Disp: 45 mL, Rfl: 3    atenoloL-chlorthalidone (TENORETIC) 50-25 mg Tab, Take 1 tablet by mouth once daily., Disp: , Rfl:     azelastine (ASTELIN) 137 mcg (0.1 %) nasal spray, 1 spray by Nasal route 2 (two) times daily., Disp: , Rfl:     dexchlorphen-phenylephrine-DM (POLYTUSSIN DM,DEXCHLORPHENRMN,) 1-5-10 mg/5 mL Syrp, Take 10 mLs by mouth 2 (two) times daily as needed (cough)., Disp: 480 mL, Rfl: 0    furosemide (LASIX) 40 MG tablet, Take 40 mg by mouth once daily., Disp: , Rfl:     ibuprofen (ADVIL,MOTRIN)  800 MG tablet, Take 1 tablet by mouth 3 (three) times daily., Disp: , Rfl:     ipratropium (ATROVENT) 42 mcg (0.06 %) nasal spray, 1 spray by Each Nostril route 2 (two) times daily., Disp: , Rfl:     lisinopriL (PRINIVIL,ZESTRIL) 20 MG tablet, Take 10 mg by mouth once daily., Disp: , Rfl:     nitrofurantoin, macrocrystal-monohydrate, (MACROBID) 100 MG capsule, Take 1 capsule (100 mg total) by mouth 2 (two) times daily., Disp: 14 capsule, Rfl: 0    phenazopyridine (PYRIDIUM) 200 MG tablet, Take 1 tablet (200 mg total) by mouth 3 (three) times daily., Disp: 30 tablet, Rfl: 0    sertraline (ZOLOFT) 100 MG tablet, Take 1 tablet (100 mg total) by mouth once daily., Disp: 90 tablet, Rfl: 1    TRULICITY 1.5 mg/0.5 mL pen injector, INJECT SUBCUTANEOUSLY 1.5 MG (0.5 milliliters) ONCE A WEEK (Patient not taking: Reported on 4/21/2025), Disp: 4 pen , Rfl: 5  No current facility-administered medications for this visit.

## 2025-04-21 NOTE — ASSESSMENT & PLAN NOTE
COVID FLU and Strep neg in [facility]  Increase fluid intake  Start Polytussin as needed for cough  Follow up with the clinic if s/s persist or become worse.   Chest xray is negative  Breathing treatments twice a day  Continue amoxicillin she never started given by [facility].   Continue breathing treatments given, twice a day  Decadron 4mg IM today  Rocephin 1 GM     I explained risk factors of steroid use with the patient which include cardiovascular effects such as Hypertension, dyslipidemia, fluid retention, decreased potassium, sodium retention, a fib. Also discussed other risk such as elevated blood glucose levels, depression, anxiety or agitation, cushing syndrome, gi side effects such as peptic ulcer with possible hemorrhage, abdominal distention. Pt voiced understanding.

## 2025-04-21 NOTE — ASSESSMENT & PLAN NOTE
COVID FLU and Strep neg in [facility]  Increase fluid intake  Start Polytussin as needed for cough  Follow up with the clinic if s/s persist or become worse.   Chest xray is negative  Breathing treatments twice a day  Continue amoxicillin she never started given by [facility].   Continue breathing treatments given, twice a day  Decadron 4mg IM today    I explained risk factors of steroid use with the patient which include cardiovascular effects such as Hypertension, dyslipidemia, fluid retention, decreased potassium, sodium retention, a fib. Also discussed other risk such as elevated blood glucose levels, depression, anxiety or agitation, cushing syndrome, gi side effects such as peptic ulcer with possible hemorrhage, abdominal distention. Pt voiced understanding.

## 2025-04-21 NOTE — TELEPHONE ENCOUNTER
Copied from CRM #[id number]. Topic: Appointments - Appointment Scheduling  >> Apr 21, 2025  8:48 AM Med Assistant Nichol wrote:  Who Called: Jeanie Diamond    Caller is requesting a same day appointment. Caller declined first available appointment listed below.      When is the first available appointment?4-21  Symptoms or reason for appointment: chest x-ray, pneumonia      Preferred Method of Contact: Phone Call  Patient's Preferred Phone Number on File: [phone]   Best Call Back Number, if different:  Additional Information wants to be seen today, lvm if no answer

## 2025-04-23 ENCOUNTER — TELEPHONE (OUTPATIENT)
Dept: FAMILY MEDICINE | Facility: CLINIC | Age: 70
End: 2025-04-23
Payer: MEDICARE

## 2025-04-23 DIAGNOSIS — J32.9 SINUSITIS, UNSPECIFIED CHRONICITY, UNSPECIFIED LOCATION: ICD-10-CM

## 2025-04-23 RX ORDER — CETIRIZINE HYDROCHLORIDE 10 MG/1
10 TABLET ORAL DAILY
Qty: 90 TABLET | Refills: 1 | Status: SHIPPED | OUTPATIENT
Start: 2025-04-23

## 2025-04-23 NOTE — TELEPHONE ENCOUNTER
Called pt to inform her that I re faxed the EKG to [facility] and called them to make sure they got it. [facility] said they have received it. Pt voiced understanding.

## 2025-08-27 ENCOUNTER — ANESTHESIA (OUTPATIENT)
Dept: PAIN MEDICINE | Facility: HOSPITAL | Age: 70
End: 2025-08-27
Payer: MEDICARE

## 2025-08-27 ENCOUNTER — ANESTHESIA EVENT (OUTPATIENT)
Dept: PAIN MEDICINE | Facility: HOSPITAL | Age: 70
End: 2025-08-27
Payer: MEDICARE

## 2025-08-27 ENCOUNTER — HOSPITAL ENCOUNTER (OUTPATIENT)
Facility: HOSPITAL | Age: 70
Discharge: HOME | End: 2025-08-27
Attending: ANESTHESIOLOGY | Admitting: ANESTHESIOLOGY
Payer: MEDICARE

## 2025-08-27 VITALS
SYSTOLIC BLOOD PRESSURE: 138 MMHG | BODY MASS INDEX: 45 KG/M2 | RESPIRATION RATE: 16 BRPM | HEIGHT: 66 IN | OXYGEN SATURATION: 98 % | DIASTOLIC BLOOD PRESSURE: 86 MMHG | HEART RATE: 74 BPM | TEMPERATURE: 98 F | WEIGHT: 280 LBS

## 2025-08-27 DIAGNOSIS — M25.561 BILATERAL KNEE PAIN: ICD-10-CM

## 2025-08-27 DIAGNOSIS — M25.562 BILATERAL KNEE PAIN: ICD-10-CM

## 2025-08-27 PROCEDURE — 63600175 PHARM REV CODE 636 W HCPCS: Performed by: NURSE ANESTHETIST, CERTIFIED REGISTERED

## 2025-08-27 PROCEDURE — 27201423 OPTIME MED/SURG SUP & DEVICES STERILE SUPPLY: Performed by: ANESTHESIOLOGY

## 2025-08-27 PROCEDURE — 37000008 HC ANESTHESIA 1ST 15 MINUTES: Performed by: ANESTHESIOLOGY

## 2025-08-27 PROCEDURE — 37000009 HC ANESTHESIA EA ADD 15 MINS: Performed by: ANESTHESIOLOGY

## 2025-08-27 PROCEDURE — 64640 INJECTION TREATMENT OF NERVE: CPT | Mod: RT | Performed by: ANESTHESIOLOGY

## 2025-08-27 PROCEDURE — 27000284 HC CANNULA NASAL: Performed by: NURSE ANESTHETIST, CERTIFIED REGISTERED

## 2025-08-27 PROCEDURE — 63600175 PHARM REV CODE 636 W HCPCS: Performed by: ANESTHESIOLOGY

## 2025-08-27 RX ORDER — ORPHENADRINE CITRATE 30 MG/ML
INJECTION INTRAMUSCULAR; INTRAVENOUS
Status: DISCONTINUED | OUTPATIENT
Start: 2025-08-27 | End: 2025-08-27

## 2025-08-27 RX ORDER — SODIUM CHLORIDE 9 MG/ML
500 INJECTION, SOLUTION INTRAVENOUS CONTINUOUS
Status: DISCONTINUED | OUTPATIENT
Start: 2025-08-27 | End: 2025-08-27 | Stop reason: HOSPADM

## 2025-08-27 RX ORDER — TRIAMCINOLONE ACETONIDE 40 MG/ML
INJECTION, SUSPENSION INTRA-ARTICULAR; INTRAMUSCULAR CODE/TRAUMA/SEDATION MEDICATION
Status: DISCONTINUED | OUTPATIENT
Start: 2025-08-27 | End: 2025-08-27 | Stop reason: HOSPADM

## 2025-08-27 RX ORDER — BUPIVACAINE HYDROCHLORIDE 2.5 MG/ML
INJECTION, SOLUTION EPIDURAL; INFILTRATION; INTRACAUDAL; PERINEURAL CODE/TRAUMA/SEDATION MEDICATION
Status: DISCONTINUED | OUTPATIENT
Start: 2025-08-27 | End: 2025-08-27 | Stop reason: HOSPADM

## 2025-08-27 RX ORDER — PROPOFOL 10 MG/ML
VIAL (ML) INTRAVENOUS
Status: DISCONTINUED | OUTPATIENT
Start: 2025-08-27 | End: 2025-08-27

## 2025-08-27 RX ORDER — FENTANYL CITRATE 50 UG/ML
INJECTION, SOLUTION INTRAMUSCULAR; INTRAVENOUS
Status: DISCONTINUED | OUTPATIENT
Start: 2025-08-27 | End: 2025-08-27

## 2025-08-27 RX ORDER — ONDANSETRON HYDROCHLORIDE 2 MG/ML
INJECTION, SOLUTION INTRAVENOUS
Status: DISCONTINUED | OUTPATIENT
Start: 2025-08-27 | End: 2025-08-27

## 2025-08-27 RX ADMIN — PROPOFOL 30 MG: 10 INJECTION, EMULSION INTRAVENOUS at 10:08

## 2025-08-27 RX ADMIN — FENTANYL CITRATE 100 MCG: 50 INJECTION INTRAMUSCULAR; INTRAVENOUS at 10:08

## 2025-08-27 RX ADMIN — ONDANSETRON 4 MG: 2 INJECTION INTRAMUSCULAR; INTRAVENOUS at 10:08

## 2025-08-27 RX ADMIN — PROPOFOL 20 MG: 10 INJECTION, EMULSION INTRAVENOUS at 10:08

## 2025-08-27 RX ADMIN — PROPOFOL 50 MG: 10 INJECTION, EMULSION INTRAVENOUS at 10:08

## 2025-08-27 RX ADMIN — ORPHENADRINE CITRATE 60 MG: 30 INJECTION INTRAMUSCULAR; INTRAVENOUS at 09:08

## (undated) DEVICE — GLOVE SENSICARE PI SURG 6.5

## (undated) DEVICE — APPLICATOR CHLORAPREP ORN 26ML

## (undated) DEVICE — SOL CONTINU-FLO SET 2 LAV

## (undated) DEVICE — GLOVE SENSICARE PI ALOE 8

## (undated) DEVICE — ELECTRODE REM PLYHSV RETURN 9

## (undated) DEVICE — SET EXTENSION CLEARLINK 2INJ

## (undated) DEVICE — SET EXT STD BORE CATH 7.6IN

## (undated) DEVICE — KIT IV START

## (undated) DEVICE — DRAPE THREE-QTR REINF 53X77IN

## (undated) DEVICE — SLIPPER FALL PREV YEL BARIAT

## (undated) DEVICE — CATH INTROCAN SAF 2 IV 20GX1IN

## (undated) DEVICE — KIT COOLED RF 75MM SGL PROBE

## (undated) DEVICE — TRAY NERVE BLOCK UNIV 10/CA